# Patient Record
Sex: FEMALE | NOT HISPANIC OR LATINO | URBAN - METROPOLITAN AREA
[De-identification: names, ages, dates, MRNs, and addresses within clinical notes are randomized per-mention and may not be internally consistent; named-entity substitution may affect disease eponyms.]

---

## 2017-01-12 ENCOUNTER — OFFICE VISIT (OUTPATIENT)
Dept: INTERNAL MEDICINE | Facility: CLINIC | Age: 72
End: 2017-01-12

## 2017-01-12 VITALS
BODY MASS INDEX: 24.75 KG/M2 | OXYGEN SATURATION: 98 % | WEIGHT: 145 LBS | HEIGHT: 64 IN | HEART RATE: 72 BPM | SYSTOLIC BLOOD PRESSURE: 135 MMHG | DIASTOLIC BLOOD PRESSURE: 85 MMHG

## 2017-01-12 DIAGNOSIS — F41.9 ANXIETY: ICD-10-CM

## 2017-01-12 DIAGNOSIS — F17.200 SMOKING ADDICTION: ICD-10-CM

## 2017-01-12 DIAGNOSIS — E78.5 HYPERLIPIDEMIA, UNSPECIFIED HYPERLIPIDEMIA TYPE: Primary | ICD-10-CM

## 2017-01-12 PROCEDURE — 99214 OFFICE O/P EST MOD 30 MIN: CPT | Performed by: INTERNAL MEDICINE

## 2017-01-12 RX ORDER — CHLORAL HYDRATE 500 MG
1 CAPSULE ORAL
COMMUNITY
End: 2019-07-26

## 2017-01-12 RX ORDER — ALPRAZOLAM 0.25 MG/1
0.25 TABLET ORAL DAILY PRN
Qty: 30 TABLET | Refills: 0 | Status: SHIPPED | OUTPATIENT
Start: 2017-01-12 | End: 2017-03-22 | Stop reason: CLARIF

## 2017-01-12 RX ORDER — VITAMIN B COMPLEX
TABLET ORAL
COMMUNITY
End: 2017-07-18 | Stop reason: CLARIF

## 2017-01-12 NOTE — PROGRESS NOTES
Subjective     Sanjuanita Bowen is a 71 y.o. female, who presents with a chief complaint of   Chief Complaint   Patient presents with   • Establish Care     Former Dr. Banegas pt   • Anxiety     Pt states she has been having bad anxiety attacks b/c her  had a stroke around Thanksgiving-he was then diagnosed with Cancer.        HPI Comments: Anxiety: Patient is here to establish care and was previous patient of Dr. Casey. She is having a lot of anxiety lately because her  had a stroke and was also diagnosed with renal cell carcinoma a little before Thanksgiving. Her  is currently at [facility] for therapy. His left side was affected, but he is moving well now and walking with a cane. No SI/HI. She feels that her mind just can't stop when she is awake and she worries all the time.     HLD: chronic issue and did not tolerate statins well due to muscle aches and pains, even Livalo. Her cholesterol increased on last check in 11/2016.            The following portions of the patient's history were reviewed and updated as appropriate: allergies, current medications, past family history, past medical history, past social history, past surgical history and problem list.    Allergies: Review of patient's allergies indicates no known allergies.    Current Outpatient Prescriptions:   •  ALPRAZolam (XANAX) 0.25 MG tablet, Take 1 tablet by mouth Daily As Needed for anxiety., Disp: 30 tablet, Rfl: 0  •  aspirin 81 MG EC tablet, Take 81 mg by mouth daily., Disp: , Rfl:   •  Cholecalciferol (VITAMIN D PO), Take 1 tablet by mouth daily., Disp: , Rfl:   •  Coenzyme Q10 (COQ10) 100 MG capsule, Take  by mouth., Disp: , Rfl:   •  Omega-3 Fatty Acids (FISH OIL) 1000 MG capsule capsule, Take 1 capsule by mouth., Disp: , Rfl:   •  SERTRALINE HCL PO, Take 50 mg by mouth Daily., Disp: , Rfl:   •  vitamin E 400 UNIT capsule, Take 400 Units by mouth Daily., Disp: , Rfl:       Review of Systems   Constitutional:  "Negative for chills and fever.   Respiratory: Negative for cough and shortness of breath.    Cardiovascular: Negative for chest pain and palpitations.   Gastrointestinal: Negative for constipation, diarrhea and vomiting.   Musculoskeletal: Negative for myalgias.   Skin: Negative for rash.   Psychiatric/Behavioral: Positive for decreased concentration. The patient is nervous/anxious.        Objective     Visit Vitals   • /85 (BP Location: Left arm, Patient Position: Sitting, Cuff Size: Adult)   • Pulse 72   • Ht 64\" (162.6 cm)   • Wt 145 lb (65.8 kg)   • SpO2 98%   • BMI 24.89 kg/m2         Physical Exam   Constitutional: She is oriented to person, place, and time. She appears well-developed and well-nourished. No distress.   HENT:   Head: Normocephalic and atraumatic.   Right Ear: Tympanic membrane and external ear normal.   Left Ear: Tympanic membrane and external ear normal.   Mouth/Throat: Oropharynx is clear and moist. No oropharyngeal exudate.   Eyes: Conjunctivae are normal. Right eye exhibits no discharge. Left eye exhibits no discharge. No scleral icterus.   Neck: Neck supple.   Cardiovascular: Normal rate, regular rhythm and normal heart sounds.  Exam reveals no gallop and no friction rub.    No murmur heard.  Pulmonary/Chest: Effort normal and breath sounds normal. No respiratory distress. She has no wheezes. She has no rales.   Lymphadenopathy:     She has no cervical adenopathy.   Neurological: She is alert and oriented to person, place, and time.   Skin: Skin is warm. No rash noted.   Psychiatric: She has a normal mood and affect. Her behavior is normal.   Nursing note and vitals reviewed.        Results for orders placed or performed in visit on 10/28/16   Comprehensive Metabolic Panel   Result Value Ref Range    Glucose 112 (H) 65 - 99 mg/dL    BUN 17 8 - 23 mg/dL    Creatinine 1.07 (H) 0.57 - 1.00 mg/dL    eGFR Non African Am 50 (L) >60 mL/min/1.73    eGFR African Am 61 >60 mL/min/1.73    " BUN/Creatinine Ratio 15.9 7.0 - 25.0    Sodium 144 136 - 145 mmol/L    Potassium 4.7 3.5 - 5.2 mmol/L    Chloride 101 98 - 107 mmol/L    Total CO2 30.4 (H) 22.0 - 29.0 mmol/L    Calcium 10.6 (H) 8.6 - 10.5 mg/dL    Total Protein 7.4 6.0 - 8.5 g/dL    Albumin 4.80 3.50 - 5.20 g/dL    Globulin 2.6 gm/dL    A/G Ratio 1.8 g/dL    Total Bilirubin 0.7 0.1 - 1.2 mg/dL    Alkaline Phosphatase 109 39 - 117 U/L    AST (SGOT) 14 1 - 32 U/L    ALT (SGPT) 16 1 - 33 U/L   Hemoglobin A1c   Result Value Ref Range    Hemoglobin A1C 6.10 (H) 4.80 - 5.60 %   Lipid Panel   Result Value Ref Range    Total Cholesterol 258 (H) 0 - 200 mg/dL    Triglycerides 162 (H) 0 - 150 mg/dL    HDL Cholesterol 61 (H) 40 - 60 mg/dL    VLDL Cholesterol 32.4 5 - 40 mg/dL    LDL Cholesterol  165 (H) 0 - 100 mg/dL   CBC & Differential   Result Value Ref Range    WBC 6.99 4.50 - 10.70 10*3/mm3    RBC 5.01 3.90 - 5.20 10*6/mm3    Hemoglobin 15.8 (H) 11.9 - 15.5 g/dL    Hematocrit 50.4 (H) 35.6 - 45.5 %    .6 (H) 80.5 - 98.2 fL    MCH 31.5 26.9 - 32.0 pg    MCHC 31.3 (L) 32.4 - 36.3 g/dL    RDW 13.9 (H) 11.7 - 13.0 %    Platelets 202 140 - 500 10*3/mm3    Neutrophil Rel % 60.0 42.7 - 76.0 %    Lymphocyte Rel % 25.2 19.6 - 45.3 %    Monocyte Rel % 9.7 5.0 - 12.0 %    Eosinophil Rel % 3.9 0.3 - 6.2 %    Basophil Rel % 0.9 0.0 - 1.5 %    Neutrophils Absolute 4.20 1.90 - 8.10 10*3/mm3    Lymphocytes Absolute 1.76 0.90 - 4.80 10*3/mm3    Monocytes Absolute 0.68 0.20 - 1.20 10*3/mm3    Eosinophils Absolute 0.27 0.00 - 0.70 10*3/mm3    Basophils Absolute 0.06 0.00 - 0.20 10*3/mm3    Immature Granulocyte Rel % 0.3 0.0 - 0.5 %    Immature Grans Absolute 0.02 0.00 - 0.03 10*3/mm3       Assessment/Plan   Sanjuanita was seen today for establish care and anxiety.    Diagnoses and all orders for this visit:    Hyperlipidemia, unspecified hyperlipidemia type:Patient's cholesterol is not under good control and she has not tolerated statin well, even Livalo in past. Will  continue current regimen of fish oil, but increase to 2000mg per day. No side effects from medications reported. Will follow up in 6 months to monitor levels form 11/2016.    Anxiety: not doing well seoncdary to stress at home with . She is going to start Sertraline and take Xanax as needed for anxiety. Will follow up in 2 months.   -     ALPRAZolam (XANAX) 0.25 MG tablet; Take 1 tablet by mouth Daily As Needed for anxiety.    Smoking addiction: discussed smoking cessation and will attempt to quit once her anxiety level as reduced when 's health is more stable. She understand importance of quitting. I would like to get low dose CT scan in future, but does not tolerate. May consider CXR to screen for lung cancer.     Needs DEXA in future as well        Return in about 2 months (around 3/13/2017) for Recheck of anxiety.    Hedy Gonzalez MD  01/12/2017

## 2017-01-12 NOTE — PATIENT INSTRUCTIONS
Start taking sertraline every day for anxiety and I will see you back two months   Take Xanax as needed  Increase fish oil to 2000mg (2 grams total)

## 2017-01-12 NOTE — MR AVS SNAPSHOT
Sanjuanita LEDESMA Jessi   1/12/2017 2:00 PM   Office Visit    Dept Phone:  [phone]   Encounter #:  [id number]    Provider:  Hedy Gonzalez MD   Department:  [facility] INTERNAL MED AND PEDS                Your Full Care Plan              Today's Medication Changes          These changes are accurate as of: 1/12/17  3:17 PM.  If you have any questions, ask your nurse or doctor.               Medication(s)that have changed:     ALPRAZolam 0.25 MG tablet   Commonly known as:  XANAX   Take 1 tablet by mouth Daily As Needed for anxiety.   What changed:  See the new instructions.   Changed by:  Hedy Gonzalez MD            Where to Get Your Medications      You can get these medications from any pharmacy     Bring a paper prescription for each of these medications     ALPRAZolam 0.25 MG tablet                  Your Updated Medication List          This list is accurate as of: 1/12/17  3:17 PM.  Always use your most recent med list.                ALPRAZolam 0.25 MG tablet   Commonly known as:  XANAX   Take 1 tablet by mouth Daily As Needed for anxiety.       aspirin 81 MG EC tablet       CoQ10 100 MG capsule       fish oil 1000 MG capsule capsule       SERTRALINE HCL PO       VITAMIN D PO       vitamin E 400 UNIT capsule               You Were Diagnosed With        Codes Comments    Hyperlipidemia, unspecified hyperlipidemia type    -  Primary ICD-10-CM: E78.5  ICD-9-CM: 272.4     Anxiety     ICD-10-CM: F41.9  ICD-9-CM: 300.00     Smoking addiction     ICD-10-CM: F17.200  ICD-9-CM: 305.1       Instructions    Start taking sertraline every day for anxiety and I will see you back two months   Take Xanax as needed  Increase fish oil to 2000mg (2 grams total)       Patient Instructions History      Upcoming Appointments     Visit Type Date Time Department    NEW PATIENT 1/12/2017  2:00 PM [facility]      [facility] Signup     Our records indicate that you have an active [facility]  "[facility] [employer] account.    You can view your After Visit Summary by going to [email or web address] and logging in with your [employer] username and password.  If you don't have a [employer] username and password but a parent or guardian has access to your record, the parent or guardian should login with their own [employer] username and password and access your record to view the After Visit Summary.    If you have questions, you can email [email or web address] or call [phone] to talk to our [employer] staff.  Remember, [employer] is NOT to be used for urgent needs.  For medical emergencies, dial 911.               Other Info from Your Visit           Allergies     No Known Allergies      Reason for Visit     Establish Care Former Dr. Banegas pt    Anxiety Pt states she has been having bad anxiety attacks b/c her  had a stroke around Thanksgiving-he was then diagnosed with Cancer.       Vital Signs     Blood Pressure Pulse Height Weight Oxygen Saturation Body Mass Index    135/85 (BP Location: Left arm, Patient Position: Sitting, Cuff Size: Adult) 72 64\" (162.6 cm) 145 lb (65.8 kg) 98% 24.89 kg/m2    Smoking Status                   Current Every Day Smoker           Problems and Diagnoses Noted     Anxiety problem    High cholesterol or triglycerides    Osteoarthritis (arthritis due to wear and tear of joints)    Smoking addiction        "

## 2017-02-18 ENCOUNTER — DOCUMENTATION (OUTPATIENT)
Dept: INTERNAL MEDICINE | Facility: CLINIC | Age: 72
End: 2017-02-18

## 2017-02-22 ENCOUNTER — DOCUMENTATION (OUTPATIENT)
Dept: INTERNAL MEDICINE | Facility: CLINIC | Age: 72
End: 2017-02-22

## 2017-03-14 ENCOUNTER — TELEPHONE (OUTPATIENT)
Dept: INTERNAL MEDICINE | Facility: CLINIC | Age: 72
End: 2017-03-14

## 2017-03-14 NOTE — TELEPHONE ENCOUNTER
Below phoned to [facility] local pharmacy in patients chart.  Xanax 0.5mg PO daily PRN #30 no refills.      ----- Message from Hedy Gonzalez MD sent at 3/13/2017  5:34 PM EDT -----  Can you send in Xanax 0.5mg PO daily prn for anxiety for #30 and no refills for patient to pharmacy?

## 2017-03-22 ENCOUNTER — OFFICE VISIT (OUTPATIENT)
Dept: INTERNAL MEDICINE | Facility: CLINIC | Age: 72
End: 2017-03-22

## 2017-03-22 VITALS
DIASTOLIC BLOOD PRESSURE: 92 MMHG | HEIGHT: 64 IN | HEART RATE: 75 BPM | BODY MASS INDEX: 24.04 KG/M2 | OXYGEN SATURATION: 97 % | SYSTOLIC BLOOD PRESSURE: 148 MMHG | WEIGHT: 140.8 LBS

## 2017-03-22 DIAGNOSIS — R73.01 IMPAIRED FASTING GLUCOSE: ICD-10-CM

## 2017-03-22 DIAGNOSIS — E78.5 HYPERLIPIDEMIA, UNSPECIFIED HYPERLIPIDEMIA TYPE: ICD-10-CM

## 2017-03-22 DIAGNOSIS — I10 ESSENTIAL HYPERTENSION: Primary | ICD-10-CM

## 2017-03-22 DIAGNOSIS — F41.9 ANXIETY: ICD-10-CM

## 2017-03-22 DIAGNOSIS — Z13.29 SCREENING FOR THYROID DISORDER: ICD-10-CM

## 2017-03-22 PROCEDURE — 99214 OFFICE O/P EST MOD 30 MIN: CPT | Performed by: INTERNAL MEDICINE

## 2017-03-22 RX ORDER — ALPRAZOLAM 0.5 MG/1
0.5 TABLET ORAL
Refills: 0 | COMMUNITY
Start: 2017-03-14 | End: 2017-04-10 | Stop reason: SDUPTHER

## 2017-03-22 NOTE — PROGRESS NOTES
"Subjective     Sanjuanita Bowen is a 71 y.o. female, who presents with a chief complaint of   Chief Complaint   Patient presents with   • Follow-up     2 mon f/u   • Anxiety     re check        HPI Comments: Anxiety: Patient is here for follow up of her anxiety. Her  is starting on Voltrient next week and is going to see a pyschiratrist and he is still very depressed and struggling. She states that she \"hates every minute of this\". She is stressed taking care of her . She has axniety still and taking xanax up to twice per day, but some days, does not need it. She feels that she is struggling to keep him up and active during the day. They have a meeting next Friday with the hospice. She has also lost 5lbs. She is overwhelmed with reverything that juwan sis doilgn, but likes dancing and insructing.        Elevated BP: Her BP is elevated today and has been elevated at home. She has never had high blood pressure, but the stress is definitely playing and part in this.     HLD: cholesterol has been elevated in past and has been on Zetia and multiple statins that she did not tolerate. She is currently taking Vitamin E per her.        The following portions of the patient's history were reviewed and updated as appropriate: allergies, current medications, past family history, past medical history, past social history, past surgical history and problem list.    Allergies: Atorvastatin    Current Outpatient Prescriptions:   •  ALPRAZolam (XANAX) 0.5 MG tablet, Take 0.5 mg by mouth., Disp: , Rfl: 0  •  aspirin 81 MG EC tablet, Take 81 mg by mouth daily., Disp: , Rfl:   •  Cholecalciferol (VITAMIN D PO), Take 1 tablet by mouth daily., Disp: , Rfl:   •  Coenzyme Q10 (COQ10) 100 MG capsule, Take  by mouth., Disp: , Rfl:   •  Omega-3 Fatty Acids (FISH OIL) 1000 MG capsule capsule, Take 1 capsule by mouth., Disp: , Rfl:   •  SERTRALINE HCL PO, Take 50 mg by mouth Daily., Disp: , Rfl:   •  vitamin E 400 UNIT capsule, Take " "400 Units by mouth Daily., Disp: , Rfl:   Medications Discontinued During This Encounter   Medication Reason   • ALPRAZolam (XANAX) 0.25 MG tablet Formulary change       Review of Systems   Constitutional: Positive for appetite change, fatigue and unexpected weight change. Negative for chills and fever.   Respiratory: Negative for cough and shortness of breath.    Cardiovascular: Negative for chest pain and palpitations.   Psychiatric/Behavioral: Positive for dysphoric mood and sleep disturbance. Negative for suicidal ideas. The patient is nervous/anxious.        Objective     /92 (BP Location: Left arm, Patient Position: Sitting, Cuff Size: Adult)  Pulse 75  Ht 64\" (162.6 cm)  Wt 140 lb 12.8 oz (63.9 kg)  SpO2 97%  BMI 24.17 kg/m2      Physical Exam   Constitutional: She is oriented to person, place, and time. She appears well-developed and well-nourished. No distress.   HENT:   Head: Normocephalic and atraumatic.   Right Ear: External ear normal.   Left Ear: External ear normal.   Mouth/Throat: Oropharynx is clear and moist. No oropharyngeal exudate.   Eyes: Conjunctivae are normal. Right eye exhibits no discharge. Left eye exhibits no discharge. No scleral icterus.   Neck: Neck supple.   Lymphadenopathy:     She has no cervical adenopathy.   Neurological: She is alert and oriented to person, place, and time.   Skin: Skin is warm. No rash noted.   Psychiatric: She has a normal mood and affect. Her behavior is normal.   Nursing note and vitals reviewed.      Results for orders placed or performed in visit on 10/28/16   Comprehensive Metabolic Panel   Result Value Ref Range    Glucose 112 (H) 65 - 99 mg/dL    BUN 17 8 - 23 mg/dL    Creatinine 1.07 (H) 0.57 - 1.00 mg/dL    eGFR Non African Am 50 (L) >60 mL/min/1.73    eGFR African Am 61 >60 mL/min/1.73    BUN/Creatinine Ratio 15.9 7.0 - 25.0    Sodium 144 136 - 145 mmol/L    Potassium 4.7 3.5 - 5.2 mmol/L    Chloride 101 98 - 107 mmol/L    Total CO2 30.4 " (H) 22.0 - 29.0 mmol/L    Calcium 10.6 (H) 8.6 - 10.5 mg/dL    Total Protein 7.4 6.0 - 8.5 g/dL    Albumin 4.80 3.50 - 5.20 g/dL    Globulin 2.6 gm/dL    A/G Ratio 1.8 g/dL    Total Bilirubin 0.7 0.1 - 1.2 mg/dL    Alkaline Phosphatase 109 39 - 117 U/L    AST (SGOT) 14 1 - 32 U/L    ALT (SGPT) 16 1 - 33 U/L   Hemoglobin A1c   Result Value Ref Range    Hemoglobin A1C 6.10 (H) 4.80 - 5.60 %   Lipid Panel   Result Value Ref Range    Total Cholesterol 258 (H) 0 - 200 mg/dL    Triglycerides 162 (H) 0 - 150 mg/dL    HDL Cholesterol 61 (H) 40 - 60 mg/dL    VLDL Cholesterol 32.4 5 - 40 mg/dL    LDL Cholesterol  165 (H) 0 - 100 mg/dL   CBC & Differential   Result Value Ref Range    WBC 6.99 4.50 - 10.70 10*3/mm3    RBC 5.01 3.90 - 5.20 10*6/mm3    Hemoglobin 15.8 (H) 11.9 - 15.5 g/dL    Hematocrit 50.4 (H) 35.6 - 45.5 %    .6 (H) 80.5 - 98.2 fL    MCH 31.5 26.9 - 32.0 pg    MCHC 31.3 (L) 32.4 - 36.3 g/dL    RDW 13.9 (H) 11.7 - 13.0 %    Platelets 202 140 - 500 10*3/mm3    Neutrophil Rel % 60.0 42.7 - 76.0 %    Lymphocyte Rel % 25.2 19.6 - 45.3 %    Monocyte Rel % 9.7 5.0 - 12.0 %    Eosinophil Rel % 3.9 0.3 - 6.2 %    Basophil Rel % 0.9 0.0 - 1.5 %    Neutrophils Absolute 4.20 1.90 - 8.10 10*3/mm3    Lymphocytes Absolute 1.76 0.90 - 4.80 10*3/mm3    Monocytes Absolute 0.68 0.20 - 1.20 10*3/mm3    Eosinophils Absolute 0.27 0.00 - 0.70 10*3/mm3    Basophils Absolute 0.06 0.00 - 0.20 10*3/mm3    Immature Granulocyte Rel % 0.3 0.0 - 0.5 %    Immature Grans Absolute 0.02 0.00 - 0.03 10*3/mm3       Assessment/Plan   Sanjuanita was seen today for follow-up and anxiety.    Diagnoses and all orders for this visit:    Essential hypertension  -     CBC & Differential  -     Comprehensive Metabolic Panel    Anxiety  -     TSH    Hyperlipidemia, unspecified hyperlipidemia type  -     Lipid Panel With LDL / HDL Ratio    Impaired fasting glucose  -     Hemoglobin A1c  -     Urinalysis With / Culture If Indicated    Screening for thyroid  disorder  -     TSH      HTN: new diagnosis and I would like to not start medications in the next month if still high. Patient wants to try diet and exercise for one month before agreeing to medication which is reasonable .  I discussed with her that I will recheck labs today and then she needs to call me with results of her home BP's. Is she is unsuccessful with DASH diet and reduced caffeine, then we will need to start a medication to lower this.    Anxiety: Under fair control given situation. Will continue sertraline and Xanax as needed. I encouraged her to continue to dance and do things that help her mood and mind. She is agreeable. Will see back in 6 months as money is tight due to her husbands medical cost and we will stay in touch through the phone and on [employer].     HLD: will recheck numbers and calculate risks. If greater than 7.5%, will consider adding Livalo which I discussed with her in clinic today.     IFG: chronic issue and will recheck labs today. I dicussed diet and exercise today. Would be a candidate for metformin based on new guidelines, but patient does not like to take medications. Will discus with her in future once I have labs back.     Return in about 6 months (around 9/22/2017) for Recheck.    Hedy Gonzalez MD  03/22/2017

## 2017-03-26 LAB
ALBUMIN SERPL-MCNC: 4.4 G/DL (ref 3.5–5.2)
ALBUMIN/GLOB SERPL: 1.8 G/DL
ALP SERPL-CCNC: 89 U/L (ref 40–129)
ALT SERPL-CCNC: 13 U/L (ref 5–33)
APPEARANCE UR: CLEAR
AST SERPL-CCNC: 16 U/L (ref 5–32)
BACTERIA #/AREA URNS HPF: ABNORMAL /HPF
BACTERIA UR CULT: ABNORMAL
BACTERIA UR CULT: ABNORMAL
BASOPHILS # BLD AUTO: 0.07 10*3/MM3 (ref 0–0.2)
BASOPHILS NFR BLD AUTO: 0.8 % (ref 0–2)
BILIRUB SERPL-MCNC: 0.8 MG/DL (ref 0.2–1.2)
BILIRUB UR QL STRIP: NEGATIVE
BUN SERPL-MCNC: 19 MG/DL (ref 8–23)
BUN/CREAT SERPL: 19.6 (ref 7–25)
CALCIUM SERPL-MCNC: 9.6 MG/DL (ref 8.8–10.5)
CASTS URNS MICRO: ABNORMAL
CASTS URNS QL MICRO: PRESENT
CHLORIDE SERPL-SCNC: 104 MMOL/L (ref 98–107)
CHOLEST SERPL-MCNC: 234 MG/DL (ref 0–200)
CO2 SERPL-SCNC: 28.1 MMOL/L (ref 22–29)
COLOR UR: YELLOW
CREAT SERPL-MCNC: 0.97 MG/DL (ref 0.57–1)
EOSINOPHIL # BLD AUTO: 0.2 10*3/MM3 (ref 0.1–0.3)
EOSINOPHIL NFR BLD AUTO: 2.3 % (ref 0–4)
EPI CELLS #/AREA URNS HPF: ABNORMAL /HPF
ERYTHROCYTE [DISTWIDTH] IN BLOOD BY AUTOMATED COUNT: 12.8 % (ref 11.5–14.5)
GLOBULIN SER CALC-MCNC: 2.5 GM/DL
GLUCOSE SERPL-MCNC: 105 MG/DL (ref 65–99)
GLUCOSE UR QL: NEGATIVE
HBA1C MFR BLD: 6 % (ref 4.8–5.6)
HCT VFR BLD AUTO: 47.1 % (ref 37–47)
HDLC SERPL-MCNC: 62 MG/DL (ref 40–60)
HGB BLD-MCNC: 15.3 G/DL (ref 12–16)
HGB UR QL STRIP: ABNORMAL
IMM GRANULOCYTES # BLD: 0.02 10*3/MM3 (ref 0–0.03)
IMM GRANULOCYTES NFR BLD: 0.2 % (ref 0–0.5)
KETONES UR QL STRIP: NEGATIVE
LDLC SERPL CALC-MCNC: 152 MG/DL (ref 0–100)
LDLC/HDLC SERPL: 2.45 {RATIO}
LEUKOCYTE ESTERASE UR QL STRIP: ABNORMAL
LYMPHOCYTES # BLD AUTO: 1.55 10*3/MM3 (ref 0.6–4.8)
LYMPHOCYTES NFR BLD AUTO: 18.1 % (ref 20–45)
MCH RBC QN AUTO: 31.8 PG (ref 27–31)
MCHC RBC AUTO-ENTMCNC: 32.5 G/DL (ref 31–37)
MCV RBC AUTO: 97.9 FL (ref 81–99)
MICRO URNS: ABNORMAL
MONOCYTES # BLD AUTO: 0.62 10*3/MM3 (ref 0–1)
MONOCYTES NFR BLD AUTO: 7.2 % (ref 3–8)
MUCOUS THREADS URNS QL MICRO: PRESENT /HPF
NEUTROPHILS # BLD AUTO: 6.12 10*3/MM3 (ref 1.5–8.3)
NEUTROPHILS NFR BLD AUTO: 71.4 % (ref 45–70)
NITRITE UR QL STRIP: NEGATIVE
NRBC BLD AUTO-RTO: 0 /100 WBC (ref 0–0)
OTHER ANTIBIOTIC SUSC ISLT: ABNORMAL
PH UR STRIP: 5.5 [PH] (ref 5–7.5)
PLATELET # BLD AUTO: 203 10*3/MM3 (ref 140–500)
POTASSIUM SERPL-SCNC: 4.1 MMOL/L (ref 3.5–5.2)
PROT SERPL-MCNC: 6.9 G/DL (ref 6–8.5)
PROT UR QL STRIP: NEGATIVE
RBC # BLD AUTO: 4.81 10*6/MM3 (ref 4.2–5.4)
RBC #/AREA URNS HPF: ABNORMAL /HPF
SODIUM SERPL-SCNC: 143 MMOL/L (ref 136–145)
SP GR UR: 1.02 (ref 1–1.03)
TRIGL SERPL-MCNC: 101 MG/DL (ref 0–150)
TSH SERPL DL<=0.005 MIU/L-ACNC: 1.28 MIU/ML (ref 0.27–4.2)
URINALYSIS REFLEX: ABNORMAL
UROBILINOGEN UR STRIP-MCNC: 0.2 MG/DL (ref 0.2–1)
VLDLC SERPL CALC-MCNC: 20.2 MG/DL (ref 7–27)
WBC # BLD AUTO: 8.58 10*3/MM3 (ref 4.8–10.8)
WBC #/AREA URNS HPF: ABNORMAL /HPF

## 2017-03-27 NOTE — PROGRESS NOTES
Please call patient with these results and let her know that there are a few things that we need to address, but overall they are stable.     She still has pre-diabetes her HgA1c or 3 month blood sugar level is 6%. She would qualify to start 500mg of metformin to lower her blood sugars once per day, but I know that she is not interested in starting new medications. I want her to consider this though. The medication does not have a lot of side effects after the first two weeks of taking it. Generally patients have some nausea and gas, but tend to do okay once they get used to it. It basically helps your insulin work better to lower your blood sugars.     Second, her cholesterol is still elevated and I would like her to consider the cholesterol lowering medication Livalo that we discussed last time that she was here. When I calculate her risks based on her current medical issues, she has around a 15% chance of having a stroke or heart attach in the next 10 years and she qualifies for a statin cholesterol lowering medication like Dr. Ruffin had placed her on the past. The Livalo is processed through the body differently and is the drug of choice for patients that do not do well with the older statin.I think we should try it, if she is willing.     Lastly, it looks like she may have a small amount of infection in her urine. Is she having symptoms or any pain she urinates? i think we should treat with a 7 day course of antibiotics if she is agreeable.     Let me know if you need me to talk to her and I will. I know this is a lot of information

## 2017-03-28 ENCOUNTER — TELEPHONE (OUTPATIENT)
Dept: INTERNAL MEDICINE | Facility: CLINIC | Age: 72
End: 2017-03-28

## 2017-03-28 RX ORDER — SULFAMETHOXAZOLE AND TRIMETHOPRIM 800; 160 MG/1; MG/1
1 TABLET ORAL 2 TIMES DAILY
Qty: 14 TABLET | Refills: 0 | Status: SHIPPED | OUTPATIENT
Start: 2017-03-28 | End: 2017-04-04

## 2017-03-28 NOTE — TELEPHONE ENCOUNTER
Patient has been advised and voiced understanding. The patient denies any want for the RX Metformin. The patient is comfortable with starting the RX Livalo (whatever mg you suggest) and also starting an antibiotic for the small infection in her urine- she denies any dysuria etc. Her primary pharmacy is [facility] (in her chart).     ----- Message from Hedy Gonzalez MD sent at 3/27/2017  8:25 AM EDT -----  Please call patient with these results and let her know that there are a few things that we need to address, but overall they are stable.     She still has pre-diabetes her HgA1c or 3 month blood sugar level is 6%. She would qualify to start 500mg of metformin to lower her blood sugars once per day, but I know that she is not interested in starting new medications. I want her to consider this though. The medication does not have a lot of side effects after the first two weeks of taking it. Generally patients have some nausea and gas, but tend to do okay once they get used to it. It basically helps your insulin work better to lower your blood sugars.     Second, her cholesterol is still elevated and I would like her to consider the cholesterol lowering medication Livalo that we discussed last time that she was here. When I calculate her risks based on her current medical issues, she has around a 15% chance of having a stroke or heart attach in the next 10 years and she qualifies for a statin cholesterol lowering medication like Dr. Ruffin had placed her on the past. The Livalo is processed through the body differently and is the drug of choice for patients that do not do well with the older statin.I think we should try it, if she is willing.     Lastly, it looks like she may have a small amount of infection in her urine. Is she having symptoms or any pain she urinates? i think we should treat with a 7 day course of antibiotics if she is agreeable.     Let me know if you need me to talk to her and I will. I know this is  a lot of information

## 2017-04-10 RX ORDER — ALPRAZOLAM 0.5 MG/1
0.5 TABLET ORAL 2 TIMES DAILY PRN
Qty: 60 TABLET | Refills: 0 | Status: SHIPPED | OUTPATIENT
Start: 2017-04-10 | End: 2017-05-05 | Stop reason: SDUPTHER

## 2017-05-02 ENCOUNTER — TELEPHONE (OUTPATIENT)
Dept: INTERNAL MEDICINE | Facility: CLINIC | Age: 72
End: 2017-05-02

## 2017-05-05 ENCOUNTER — TELEPHONE (OUTPATIENT)
Dept: INTERNAL MEDICINE | Facility: CLINIC | Age: 72
End: 2017-05-05

## 2017-05-05 RX ORDER — ALPRAZOLAM 0.5 MG/1
0.5 TABLET ORAL 2 TIMES DAILY PRN
Qty: 60 TABLET | Refills: 0 | OUTPATIENT
Start: 2017-05-05 | End: 2017-06-05 | Stop reason: SDUPTHER

## 2017-05-08 ENCOUNTER — PATIENT MESSAGE (OUTPATIENT)
Dept: INTERNAL MEDICINE | Facility: CLINIC | Age: 72
End: 2017-05-08

## 2017-06-05 ENCOUNTER — TELEPHONE (OUTPATIENT)
Dept: INTERNAL MEDICINE | Facility: CLINIC | Age: 72
End: 2017-06-05

## 2017-06-05 RX ORDER — ALPRAZOLAM 0.5 MG/1
0.5 TABLET ORAL 2 TIMES DAILY PRN
Qty: 60 TABLET | Refills: 2 | OUTPATIENT
Start: 2017-06-05 | End: 2017-07-03 | Stop reason: SDUPTHER

## 2017-06-05 NOTE — TELEPHONE ENCOUNTER
Patient has been advised. [id number] LVM with RX, per Dr. Gonzalez.     ----- Message from Hedy Gonzalez MD sent at 6/5/2017  7:52 AM EDT -----  Regarding: FW: Prescription Question  Contact: [phone]  Can you call this in for Mrs. Bowen? Please give her 2 refills if possible which will give her a 3 month supply until I see her again in 9/2017. Can you also ask her how Mr. Bowen is doing?      ----- Message -----     From: Dani Burkett CMA     Sent: 6/5/2017   7:51 AM       To: Hedy Gonzalez MD  Subject: FW: Prescription Question                            ----- Message -----     From: Sanjuanita LEDESMA Jessi     Sent: 6/4/2017   1:16 PM       To: Luis Carlos [facility]  Subject: Prescription Question                            Need refill of prescription #[id number], Alprazolam, 2 daily - prescription runs out 6/5/17, need refilled for 6/6/17 please.

## 2017-06-05 NOTE — TELEPHONE ENCOUNTER
Patient states she contacted the [employer] help number this AM and LVM asking for someone to call her back, she is going to ask for help switching her messages to get sent properly to our office. As well as asking the help team questions regarding with Mr. Bowen's [employer].     ----- Message from Hedy Gonzalez MD sent at 6/5/2017  8:16 AM EDT -----  Regarding: FW: [employer] for Rob Bowen  Contact: [phone]  Can you help her with access to [employer] when you call her back?   ----- Message -----     From: Dani Burkett CMA     Sent: 6/5/2017   8:08 AM       To: Hedy Gonzalez MD  Subject: FW: [employer] for Rob Bowen                    ----- Message -----     From: Sanjuanita LEDESMA Bowen     Sent: 6/5/2017   8:07 AM       To: Luis Carlos [facility]  Subject: [employer] for Rob Bowen                        Dr. Gonzalez, I keep getting messages from [employer] for Rob - haven't been able to access - I have him signed in to [employer] for [facility], but have never set up one for [facility].  Don't I need an access code or something to set up one for Rob?  He's being released from [facility] this coming Friday and we see you next Monday the 12th.

## 2017-06-12 ENCOUNTER — APPOINTMENT (OUTPATIENT)
Dept: GENERAL RADIOLOGY | Facility: HOSPITAL | Age: 72
End: 2017-06-12

## 2017-06-12 DIAGNOSIS — S99.912A ANKLE INJURY, LEFT, INITIAL ENCOUNTER: Primary | ICD-10-CM

## 2017-06-12 PROCEDURE — 73610 X-RAY EXAM OF ANKLE: CPT | Performed by: GENERAL PRACTICE

## 2017-07-03 RX ORDER — ALPRAZOLAM 0.5 MG/1
0.5 TABLET ORAL 2 TIMES DAILY PRN
Qty: 60 TABLET | Refills: 2 | Status: SHIPPED | OUTPATIENT
Start: 2017-07-03 | End: 2017-08-17

## 2017-07-03 RX ORDER — ALPRAZOLAM 0.5 MG/1
0.5 TABLET ORAL 2 TIMES DAILY PRN
Qty: 60 TABLET | Refills: 2 | Status: SHIPPED | OUTPATIENT
Start: 2017-07-03 | End: 2017-07-03 | Stop reason: SDUPTHER

## 2017-07-03 RX ORDER — ALPRAZOLAM 0.5 MG/1
0.5 TABLET ORAL 2 TIMES DAILY PRN
Qty: 60 TABLET | Refills: 2 | OUTPATIENT
Start: 2017-07-03 | End: 2017-07-03 | Stop reason: SDUPTHER

## 2017-07-18 ENCOUNTER — OFFICE VISIT (OUTPATIENT)
Dept: INTERNAL MEDICINE | Facility: CLINIC | Age: 72
End: 2017-07-18

## 2017-07-18 VITALS
OXYGEN SATURATION: 98 % | SYSTOLIC BLOOD PRESSURE: 132 MMHG | DIASTOLIC BLOOD PRESSURE: 82 MMHG | HEIGHT: 65 IN | WEIGHT: 130.2 LBS | BODY MASS INDEX: 21.69 KG/M2 | HEART RATE: 79 BPM

## 2017-07-18 DIAGNOSIS — Z63.79 OTHER STRESSFUL LIFE EVENTS AFFECTING FAMILY AND HOUSEHOLD: ICD-10-CM

## 2017-07-18 DIAGNOSIS — F41.9 ANXIETY: Primary | ICD-10-CM

## 2017-07-18 PROCEDURE — 99214 OFFICE O/P EST MOD 30 MIN: CPT | Performed by: INTERNAL MEDICINE

## 2017-07-18 RX ORDER — SERTRALINE HYDROCHLORIDE 25 MG/1
25 TABLET, FILM COATED ORAL DAILY
Qty: 30 TABLET | Refills: 1 | Status: SHIPPED | OUTPATIENT
Start: 2017-07-18 | End: 2017-08-17

## 2017-07-18 NOTE — PROGRESS NOTES
"Subjective     Sanjuanita Bowen is a 72 y.o. female, who presents with a chief complaint of   Chief Complaint   Patient presents with   • Follow-up     6 mon f/u   • Anxiety     x questions, anxiety attacks \"almost every day\" taking rx xanax po bid       HPI Comments: 73 yo F with smoking addiction who presents for follow up of her anxiety. Her  has been sick with metastatic renal cell carcinoma. She is struggling with her mood and anxiety. She feels as though she just can't deal with caretaker stress and her  doing so poorly. She is sometimes taking two Xanax per day due to the stress. She is not sleeping well at times either. She feels that she doesn't have much of a life anymore other than caring for her  and they are always waiting on test to decide the next plan of action.        The following portions of the patient's history were reviewed and updated as appropriate: allergies, current medications, past family history, past medical history, past social history, past surgical history and problem list.    Allergies: Atorvastatin    Current Outpatient Prescriptions:   •  ALPRAZolam (XANAX) 0.5 MG tablet, Take 1 tablet by mouth 2 (Two) Times a Day As Needed for Anxiety., Disp: 60 tablet, Rfl: 2  •  aspirin 81 MG EC tablet, Take 81 mg by mouth daily., Disp: , Rfl:   •  Cholecalciferol (VITAMIN D PO), Take 1 tablet by mouth daily., Disp: , Rfl:   •  Omega-3 Fatty Acids (FISH OIL) 1000 MG capsule capsule, Take 1 capsule by mouth., Disp: , Rfl:   •  vitamin E 400 UNIT capsule, Take 400 Units by mouth Daily., Disp: , Rfl:   •  sertraline (ZOLOFT) 25 MG tablet, Take 1 tablet by mouth Daily., Disp: 30 tablet, Rfl: 1  Medications Discontinued During This Encounter   Medication Reason   • pitavastatin calcium (LIVALO) 1 MG tablet tablet Formulary change   • SERTRALINE HCL PO Formulary change   • Coenzyme Q10 (COQ10) 100 MG capsule Formulary change       Review of Systems   Constitutional: Negative for " "chills and fever.   HENT: Negative for congestion.    Respiratory: Negative for cough and shortness of breath.    Psychiatric/Behavioral: Positive for decreased concentration and dysphoric mood. Negative for sleep disturbance and suicidal ideas. The patient is nervous/anxious.        Objective     /82 (BP Location: Left arm, Patient Position: Sitting, Cuff Size: Adult)  Pulse 79  Ht 65\" (165.1 cm)  Wt 130 lb 3.2 oz (59.1 kg)  SpO2 98%  BMI 21.67 kg/m2      Physical Exam   Constitutional: She is oriented to person, place, and time. She appears well-developed and well-nourished. No distress.   HENT:   Head: Normocephalic and atraumatic.   Right Ear: External ear normal.   Left Ear: External ear normal.   Mouth/Throat: Oropharynx is clear and moist. No oropharyngeal exudate.   Eyes: Conjunctivae are normal. Right eye exhibits no discharge. Left eye exhibits no discharge. No scleral icterus.   Neck: Neck supple.   Cardiovascular: Normal rate, regular rhythm and normal heart sounds.  Exam reveals no gallop and no friction rub.    No murmur heard.  Pulmonary/Chest: Effort normal and breath sounds normal. No respiratory distress. She has no wheezes. She has no rales.   Lymphadenopathy:     She has no cervical adenopathy.   Neurological: She is alert and oriented to person, place, and time.   Skin: Skin is warm. No rash noted.   Psychiatric: She has a normal mood and affect. Her behavior is normal.   Nursing note and vitals reviewed.        Results for orders placed or performed in visit on 03/22/17   Urine culture, Comprehensive   Result Value Ref Range    Urine Culture Final report (A)     Result 1 Escherichia coli (A)     Susceptibility Testing Comment    Comprehensive Metabolic Panel   Result Value Ref Range    Glucose 105 (H) 65 - 99 mg/dL    BUN 19 8 - 23 mg/dL    Creatinine 0.97 0.57 - 1.00 mg/dL    eGFR Non African Am 56 (L) >60 mL/min/1.73    eGFR African Am 68 >60 mL/min/1.73    BUN/Creatinine Ratio " 19.6 7.0 - 25.0    Sodium 143 136 - 145 mmol/L    Potassium 4.1 3.5 - 5.2 mmol/L    Chloride 104 98 - 107 mmol/L    Total CO2 28.1 22.0 - 29.0 mmol/L    Calcium 9.6 8.8 - 10.5 mg/dL    Total Protein 6.9 6.0 - 8.5 g/dL    Albumin 4.40 3.50 - 5.20 g/dL    Globulin 2.5 gm/dL    A/G Ratio 1.8 g/dL    Total Bilirubin 0.8 0.2 - 1.2 mg/dL    Alkaline Phosphatase 89 40 - 129 U/L    AST (SGOT) 16 5 - 32 U/L    ALT (SGPT) 13 5 - 33 U/L   Lipid Panel With LDL / HDL Ratio   Result Value Ref Range    Total Cholesterol 234 (H) 0 - 200 mg/dL    Triglycerides 101 0 - 150 mg/dL    HDL Cholesterol 62 (H) 40 - 60 mg/dL    VLDL Cholesterol 20.2 7 - 27 mg/dL    LDL Cholesterol  152 (H) 0 - 100 mg/dL    LDL/HDL Ratio 2.45    Hemoglobin A1c   Result Value Ref Range    Hemoglobin A1C 6.00 (H) 4.80 - 5.60 %   TSH   Result Value Ref Range    TSH 1.280 0.270 - 4.200 mIU/mL   Urinalysis With / Culture If Indicated   Result Value Ref Range    Specific Gravity, UA 1.021 1.005 - 1.030    pH, UA 5.5 5.0 - 7.5    Color, UA Yellow Yellow    Appearance, UA Clear Clear    Leukocytes, UA 1+ (A) Negative    Protein Negative Negative/Trace    Glucose, UA Negative Negative    Ketones Negative Negative    Blood, UA 2+ (A) Negative    Bilirubin, UA Negative Negative    Urobilinogen, UA 0.2 0.2 - 1.0 mg/dL    Nitrite, UA Negative Negative    Microscopic Examination See below:     Urinalysis Reflex Comment    Microscopic Examination   Result Value Ref Range    WBC, UA 6-10 (A) 0 - 5 /hpf    RBC, UA 3-10 (A) 0 - 2 /hpf    Epithelial Cells (non renal) 0-10 0 - 10 /hpf    Casts Present (A) None seen    Cast Type Hyaline casts N/A    Mucus, UA Present Not Estab. /hpf    Bacteria, UA Many (A) None seen/Few /hpf   CBC & Differential   Result Value Ref Range    WBC 8.58 4.80 - 10.80 10*3/mm3    RBC 4.81 4.20 - 5.40 10*6/mm3    Hemoglobin 15.3 12.0 - 16.0 g/dL    Hematocrit 47.1 (H) 37.0 - 47.0 %    MCV 97.9 81.0 - 99.0 fL    MCH 31.8 (H) 27.0 - 31.0 pg    MCHC 32.5  31.0 - 37.0 g/dL    RDW 12.8 11.5 - 14.5 %    Platelets 203 140 - 500 10*3/mm3    Neutrophil Rel % 71.4 (H) 45.0 - 70.0 %    Lymphocyte Rel % 18.1 (L) 20.0 - 45.0 %    Monocyte Rel % 7.2 3.0 - 8.0 %    Eosinophil Rel % 2.3 0.0 - 4.0 %    Basophil Rel % 0.8 0.0 - 2.0 %    Neutrophils Absolute 6.12 1.50 - 8.30 10*3/mm3    Lymphocytes Absolute 1.55 0.60 - 4.80 10*3/mm3    Monocytes Absolute 0.62 0.00 - 1.00 10*3/mm3    Eosinophils Absolute 0.20 0.10 - 0.30 10*3/mm3    Basophils Absolute 0.07 0.00 - 0.20 10*3/mm3    Immature Granulocyte Rel % 0.2 0.0 - 0.5 %    Immature Grans Absolute 0.02 0.00 - 0.03 10*3/mm3    nRBC 0.0 0.0 - 0.0 /100 WBC       Assessment/Plan   Sanjuanita was seen today for follow-up and anxiety.    Diagnoses and all orders for this visit:    Anxiety    Other stressful life events affecting family and household    Other orders  -     sertraline (ZOLOFT) 25 MG tablet; Take 1 tablet by mouth Daily.      Will start Zoloft once daily and then use Xanax as needed. I will see her back in 4 weeks. She is going to see a therapist this week as well which I think is good.I will discuss care with Dr. Hernandez today. I do think that her  should go on hospice, but we are awaiting CT scan to make decision. I spent greater than 50% of 25 minutes discussing anxiety, treatment and management and providing anticipatory guidance regarding stress related to illness and caring for loved one and hospice care.     Will recheck cholesterol and labs when she returns for follow up in 4 weeks.    Return in about 4 weeks (around 8/15/2017) for Recheck of anxiety and cholesterol .    Hedy Gonzalez MD  07/18/2017

## 2017-07-31 ENCOUNTER — TELEPHONE (OUTPATIENT)
Dept: INTERNAL MEDICINE | Facility: CLINIC | Age: 72
End: 2017-07-31

## 2017-07-31 NOTE — TELEPHONE ENCOUNTER
Patient has been advised and voiced understanding. [facility] pharmacy in patient chart, LVM on pharmacy vm with script below per Dr. Gonzalez    ----- Message from Hedy Gonzalez MD sent at 7/31/2017  1:40 PM EDT -----  Can you send in Klonopin 0.5mg PO BID PRN anxiety, #60 with no refills for her? Please tell her to stop Xanax.

## 2017-08-17 ENCOUNTER — TELEPHONE (OUTPATIENT)
Dept: INTERNAL MEDICINE | Facility: CLINIC | Age: 72
End: 2017-08-17

## 2017-08-17 ENCOUNTER — OFFICE VISIT (OUTPATIENT)
Dept: INTERNAL MEDICINE | Facility: CLINIC | Age: 72
End: 2017-08-17

## 2017-08-17 VITALS
SYSTOLIC BLOOD PRESSURE: 128 MMHG | HEIGHT: 65 IN | WEIGHT: 128 LBS | OXYGEN SATURATION: 96 % | BODY MASS INDEX: 21.33 KG/M2 | DIASTOLIC BLOOD PRESSURE: 82 MMHG | HEART RATE: 88 BPM

## 2017-08-17 DIAGNOSIS — E78.5 HYPERLIPIDEMIA, UNSPECIFIED HYPERLIPIDEMIA TYPE: ICD-10-CM

## 2017-08-17 DIAGNOSIS — R73.01 IFG (IMPAIRED FASTING GLUCOSE): ICD-10-CM

## 2017-08-17 DIAGNOSIS — F41.9 ANXIETY: Primary | ICD-10-CM

## 2017-08-17 DIAGNOSIS — E78.5 HYPERLIPIDEMIA, UNSPECIFIED HYPERLIPIDEMIA TYPE: Primary | ICD-10-CM

## 2017-08-17 DIAGNOSIS — R73.01 IMPAIRED FASTING GLUCOSE: ICD-10-CM

## 2017-08-17 PROCEDURE — 99214 OFFICE O/P EST MOD 30 MIN: CPT | Performed by: INTERNAL MEDICINE

## 2017-08-17 RX ORDER — CLONAZEPAM 0.5 MG/1
TABLET ORAL
Refills: 0 | COMMUNITY
Start: 2017-07-31 | End: 2017-08-17

## 2017-08-17 RX ORDER — ALPRAZOLAM 0.5 MG/1
0.5 TABLET ORAL 3 TIMES DAILY PRN
Qty: 90 TABLET | Refills: 2 | OUTPATIENT
Start: 2017-08-17 | End: 2017-10-20

## 2017-08-17 NOTE — TELEPHONE ENCOUNTER
Orders added.     ----- Message -----     From: Gaby Mcfarland     Sent: 8/17/2017  11:41 AM       To: Luis Carlos Kim [facility]  Subject: LAB ORDERS PLEASE                                VALENTIN    Patient was here today to see Dr. Gonzalez.  Scheduled to come back in September for labs but would prefer to go to [facility] near her on [address] in [place].     Can we put the orders in for September and I will [facility] for her to have done on there in September.      Pt # [phone]

## 2017-08-17 NOTE — PROGRESS NOTES
Subjective     Sanjuanita Bowen is a 72 y.o. female, who presents with a chief complaint of   Chief Complaint   Patient presents with   • Follow-up     4 wk f/u, rx check, pt fasting for cholesterol re ceck    • Anxiety     pt states rx klonopin bid prn is not helping        HPI Comments: 73 yo F here for follow up. Her  is currently dying at home on hospice from metastatic renal cancer. She is not coping well with the anxiety and stress. She has tried SSRI's in past and they have not helped. Xanax seems to help the most. She is still smoking. She is having lots of care giver depression and burnout. Rob is not drinking or eating much now and they think that he will die soon.     Her labs are due in 9/2017 to check her HLD and IFG. She has not been taking very good care of herself. She is trying to eat well, but cannot all the time. She has lost 5lbs since I saw her in June.        The following portions of the patient's history were reviewed and updated as appropriate: allergies, current medications, past family history, past medical history, past social history, past surgical history and problem list.    Allergies: Atorvastatin    Current Outpatient Prescriptions:   •  aspirin 81 MG EC tablet, Take 81 mg by mouth daily., Disp: , Rfl:   •  Cholecalciferol (VITAMIN D PO), Take 1 tablet by mouth daily., Disp: , Rfl:   •  Omega-3 Fatty Acids (FISH OIL) 1000 MG capsule capsule, Take 1 capsule by mouth., Disp: , Rfl:   •  vitamin E 400 UNIT capsule, Take 400 Units by mouth Daily., Disp: , Rfl:   •  ALPRAZolam (XANAX) 0.5 MG tablet, Take 1 tablet by mouth 3 (Three) Times a Day As Needed for Anxiety., Disp: 90 tablet, Rfl: 2  Medications Discontinued During This Encounter   Medication Reason   • ALPRAZolam (XANAX) 0.5 MG tablet    • sertraline (ZOLOFT) 25 MG tablet Therapy completed   • clonazePAM (KlonoPIN) 0.5 MG tablet Therapy completed       Review of Systems   Constitutional: Negative for chills and fever.  "  Neurological: Negative for dizziness and headaches.   Psychiatric/Behavioral: Positive for decreased concentration and dysphoric mood. Negative for sleep disturbance and suicidal ideas. The patient is nervous/anxious.        Objective     /82 (BP Location: Left arm, Patient Position: Sitting, Cuff Size: Adult)  Pulse 88  Ht 65\" (165.1 cm)  Wt 128 lb (58.1 kg)  SpO2 96%  BMI 21.3 kg/m2      Physical Exam   Constitutional: She is oriented to person, place, and time. She appears well-developed and well-nourished. No distress.   HENT:   Head: Normocephalic and atraumatic.   Right Ear: External ear normal.   Left Ear: External ear normal.   Mouth/Throat: Oropharynx is clear and moist. No oropharyngeal exudate.   Eyes: Conjunctivae are normal. Right eye exhibits no discharge. Left eye exhibits no discharge. No scleral icterus.   Neck: Neck supple.   Cardiovascular: Normal rate, regular rhythm and normal heart sounds.  Exam reveals no gallop and no friction rub.    No murmur heard.  Pulmonary/Chest: Effort normal and breath sounds normal. No respiratory distress. She has no wheezes. She has no rales.   Lymphadenopathy:     She has no cervical adenopathy.   Neurological: She is alert and oriented to person, place, and time.   Skin: Skin is warm. No rash noted.   Psychiatric: Her behavior is normal. Her mood appears anxious. Her affect is angry. She exhibits a depressed mood.   Nursing note and vitals reviewed.        Results for orders placed or performed in visit on 03/22/17   Urine culture, Comprehensive   Result Value Ref Range    Urine Culture Final report (A)     Result 1 Escherichia coli (A)     Susceptibility Testing Comment    Comprehensive Metabolic Panel   Result Value Ref Range    Glucose 105 (H) 65 - 99 mg/dL    BUN 19 8 - 23 mg/dL    Creatinine 0.97 0.57 - 1.00 mg/dL    eGFR Non African Am 56 (L) >60 mL/min/1.73    eGFR African Am 68 >60 mL/min/1.73    BUN/Creatinine Ratio 19.6 7.0 - 25.0    Sodium " 143 136 - 145 mmol/L    Potassium 4.1 3.5 - 5.2 mmol/L    Chloride 104 98 - 107 mmol/L    Total CO2 28.1 22.0 - 29.0 mmol/L    Calcium 9.6 8.8 - 10.5 mg/dL    Total Protein 6.9 6.0 - 8.5 g/dL    Albumin 4.40 3.50 - 5.20 g/dL    Globulin 2.5 gm/dL    A/G Ratio 1.8 g/dL    Total Bilirubin 0.8 0.2 - 1.2 mg/dL    Alkaline Phosphatase 89 40 - 129 U/L    AST (SGOT) 16 5 - 32 U/L    ALT (SGPT) 13 5 - 33 U/L   Lipid Panel With LDL / HDL Ratio   Result Value Ref Range    Total Cholesterol 234 (H) 0 - 200 mg/dL    Triglycerides 101 0 - 150 mg/dL    HDL Cholesterol 62 (H) 40 - 60 mg/dL    VLDL Cholesterol 20.2 7 - 27 mg/dL    LDL Cholesterol  152 (H) 0 - 100 mg/dL    LDL/HDL Ratio 2.45    Hemoglobin A1c   Result Value Ref Range    Hemoglobin A1C 6.00 (H) 4.80 - 5.60 %   TSH   Result Value Ref Range    TSH 1.280 0.270 - 4.200 mIU/mL   Urinalysis With / Culture If Indicated   Result Value Ref Range    Specific Gravity, UA 1.021 1.005 - 1.030    pH, UA 5.5 5.0 - 7.5    Color, UA Yellow Yellow    Appearance, UA Clear Clear    Leukocytes, UA 1+ (A) Negative    Protein Negative Negative/Trace    Glucose, UA Negative Negative    Ketones Negative Negative    Blood, UA 2+ (A) Negative    Bilirubin, UA Negative Negative    Urobilinogen, UA 0.2 0.2 - 1.0 mg/dL    Nitrite, UA Negative Negative    Microscopic Examination See below:     Urinalysis Reflex Comment    Microscopic Examination   Result Value Ref Range    WBC, UA 6-10 (A) 0 - 5 /hpf    RBC, UA 3-10 (A) 0 - 2 /hpf    Epithelial Cells (non renal) 0-10 0 - 10 /hpf    Casts Present (A) None seen    Cast Type Hyaline casts N/A    Mucus, UA Present Not Estab. /hpf    Bacteria, UA Many (A) None seen/Few /hpf   CBC & Differential   Result Value Ref Range    WBC 8.58 4.80 - 10.80 10*3/mm3    RBC 4.81 4.20 - 5.40 10*6/mm3    Hemoglobin 15.3 12.0 - 16.0 g/dL    Hematocrit 47.1 (H) 37.0 - 47.0 %    MCV 97.9 81.0 - 99.0 fL    MCH 31.8 (H) 27.0 - 31.0 pg    MCHC 32.5 31.0 - 37.0 g/dL    RDW  12.8 11.5 - 14.5 %    Platelets 203 140 - 500 10*3/mm3    Neutrophil Rel % 71.4 (H) 45.0 - 70.0 %    Lymphocyte Rel % 18.1 (L) 20.0 - 45.0 %    Monocyte Rel % 7.2 3.0 - 8.0 %    Eosinophil Rel % 2.3 0.0 - 4.0 %    Basophil Rel % 0.8 0.0 - 2.0 %    Neutrophils Absolute 6.12 1.50 - 8.30 10*3/mm3    Lymphocytes Absolute 1.55 0.60 - 4.80 10*3/mm3    Monocytes Absolute 0.62 0.00 - 1.00 10*3/mm3    Eosinophils Absolute 0.20 0.10 - 0.30 10*3/mm3    Basophils Absolute 0.07 0.00 - 0.20 10*3/mm3    Immature Granulocyte Rel % 0.2 0.0 - 0.5 %    Immature Grans Absolute 0.02 0.00 - 0.03 10*3/mm3    nRBC 0.0 0.0 - 0.0 /100 WBC       Assessment/Plan   Sanjuanita was seen today for follow-up and anxiety.    Diagnoses and all orders for this visit:    Anxiety    Hyperlipidemia, unspecified hyperlipidemia type    Impaired fasting glucose    Other orders  -     ALPRAZolam (XANAX) 0.5 MG tablet; Take 1 tablet by mouth 3 (Three) Times a Day As Needed for Anxiety.    I have increased Xanax to three times per day as needed. We have tried SSRI's and Klonopin in the past and they have not been helpful. The patient knows that these are addicting, but I am hoping that she will be able to taper off once her situation is better. Rbo is currently on hospice barely eating or drinking and I think that it will no be long before he dies. The patient understands this and is tearful and in distress today. She has many emotions today. She has my number and knows that she can call anytime. Follow up in 4 weeks with labs before. I spent 25 minutes of face to face time with patient counseling her on the normal grieving process and care giver depression and burnout       Will follow up of on HDL and IFG when she returns next month.     Return in about 4 weeks (around 9/17/2017) for Recheck.    Hedy Gonzalez MD  08/17/2017

## 2017-08-17 NOTE — TELEPHONE ENCOUNTER
RX Xanax 0.5mg po TID prn #90 2 refills called into pharmacy [facility] in [place] per Dr. Gonzalez.     ----- Message from Hedy Gonzalez MD sent at 8/17/2017 10:10 AM EDT -----  Can you call in Xanax for her ASAP so that she can pick it up on her way home? Thanks

## 2017-10-06 LAB
ALBUMIN SERPL-MCNC: 4.8 G/DL (ref 3.5–4.8)
ALBUMIN/GLOB SERPL: 1.6 {RATIO} (ref 1.2–2.2)
ALP SERPL-CCNC: 100 IU/L (ref 39–117)
ALT SERPL-CCNC: 13 IU/L (ref 0–32)
APPEARANCE UR: ABNORMAL
AST SERPL-CCNC: 19 IU/L (ref 0–40)
BACTERIA #/AREA URNS HPF: ABNORMAL /[HPF]
BACTERIA UR CULT: NORMAL
BACTERIA UR CULT: NORMAL
BASOPHILS # BLD AUTO: 0.1 X10E3/UL (ref 0–0.2)
BASOPHILS NFR BLD AUTO: 1 %
BILIRUB SERPL-MCNC: 0.8 MG/DL (ref 0–1.2)
BILIRUB UR QL STRIP: NEGATIVE
BUN SERPL-MCNC: 17 MG/DL (ref 8–27)
BUN/CREAT SERPL: 16 (ref 12–28)
CALCIUM SERPL-MCNC: 10 MG/DL (ref 8.7–10.3)
CASTS URNS MICRO: ABNORMAL
CASTS URNS QL MICRO: PRESENT /LPF
CHLORIDE SERPL-SCNC: 101 MMOL/L (ref 96–106)
CHOLEST SERPL-MCNC: 255 MG/DL (ref 100–199)
CHOLEST/HDLC SERPL: 3.4 RATIO UNITS (ref 0–4.4)
CO2 SERPL-SCNC: 25 MMOL/L (ref 18–29)
COLOR UR: YELLOW
CREAT SERPL-MCNC: 1.08 MG/DL (ref 0.57–1)
EOSINOPHIL # BLD AUTO: 0.2 X10E3/UL (ref 0–0.4)
EOSINOPHIL NFR BLD AUTO: 2 %
EPI CELLS #/AREA URNS HPF: ABNORMAL /HPF
ERYTHROCYTE [DISTWIDTH] IN BLOOD BY AUTOMATED COUNT: 13.6 % (ref 12.3–15.4)
GLOBULIN SER CALC-MCNC: 3 G/DL (ref 1.5–4.5)
GLUCOSE SERPL-MCNC: 126 MG/DL (ref 65–99)
GLUCOSE UR QL: NEGATIVE
HBA1C MFR BLD: 6 % (ref 4.8–5.6)
HCT VFR BLD AUTO: 48.7 % (ref 34–46.6)
HDLC SERPL-MCNC: 75 MG/DL
HGB BLD-MCNC: 16.8 G/DL (ref 11.1–15.9)
HGB UR QL STRIP: ABNORMAL
IMM GRANULOCYTES # BLD: 0 X10E3/UL (ref 0–0.1)
IMM GRANULOCYTES NFR BLD: 0 %
KETONES UR QL STRIP: NEGATIVE
LDLC SERPL CALC-MCNC: 152 MG/DL (ref 0–99)
LEUKOCYTE ESTERASE UR QL STRIP: ABNORMAL
LYMPHOCYTES # BLD AUTO: 1.4 X10E3/UL (ref 0.7–3.1)
LYMPHOCYTES NFR BLD AUTO: 15 %
MCH RBC QN AUTO: 33.2 PG (ref 26.6–33)
MCHC RBC AUTO-ENTMCNC: 34.5 G/DL (ref 31.5–35.7)
MCV RBC AUTO: 96 FL (ref 79–97)
MICRO URNS: ABNORMAL
MONOCYTES # BLD AUTO: 0.7 X10E3/UL (ref 0.1–0.9)
MONOCYTES NFR BLD AUTO: 7 %
MUCOUS THREADS URNS QL MICRO: PRESENT
NEUTROPHILS # BLD AUTO: 7.1 X10E3/UL (ref 1.4–7)
NEUTROPHILS NFR BLD AUTO: 75 %
NITRITE UR QL STRIP: NEGATIVE
PH UR STRIP: 5.5 [PH] (ref 5–7.5)
PLATELET # BLD AUTO: 215 X10E3/UL (ref 150–379)
POTASSIUM SERPL-SCNC: 4.7 MMOL/L (ref 3.5–5.2)
PROT SERPL-MCNC: 7.8 G/DL (ref 6–8.5)
PROT UR QL STRIP: ABNORMAL
RBC # BLD AUTO: 5.06 X10E6/UL (ref 3.77–5.28)
RBC #/AREA URNS HPF: ABNORMAL /HPF
SODIUM SERPL-SCNC: 144 MMOL/L (ref 134–144)
SP GR UR: 1.02 (ref 1–1.03)
TRIGL SERPL-MCNC: 141 MG/DL (ref 0–149)
URINALYSIS REFLEX: ABNORMAL
UROBILINOGEN UR STRIP-MCNC: 0.2 MG/DL (ref 0.2–1)
VLDLC SERPL CALC-MCNC: 28 MG/DL (ref 5–40)
WBC # BLD AUTO: 9.5 X10E3/UL (ref 3.4–10.8)
WBC #/AREA URNS HPF: ABNORMAL /HPF

## 2017-10-09 ENCOUNTER — TELEPHONE (OUTPATIENT)
Dept: INTERNAL MEDICINE | Facility: CLINIC | Age: 72
End: 2017-10-09

## 2017-10-09 NOTE — TELEPHONE ENCOUNTER
"Patient has been advised and voiced understanding. Patient states she is having \"anxiety issues\", nervous, also stating she is trying to wean herself off of RX Xanax. I asked patient if she wanted to move up her f/u with Dr. Gonzalez and patient states she would like to see how she does, but if feeling worse will call back to move apt up.     ----- Message from Hedy Gonzalez MD sent at 10/6/2017  8:07 AM EDT -----  Please call patient and let her know that I reviewed her labs. She still has elevated BG's and cholesterol which we will talk about at her visit. She has some elevation in her Hgb as well likely related to her smoking and we need to talk about this then. Nothing to worry about now. I hope she is doing well and call me if she needs anything before late October.    "

## 2017-10-20 ENCOUNTER — OFFICE VISIT (OUTPATIENT)
Dept: INTERNAL MEDICINE | Facility: CLINIC | Age: 72
End: 2017-10-20

## 2017-10-20 VITALS
OXYGEN SATURATION: 93 % | DIASTOLIC BLOOD PRESSURE: 76 MMHG | HEART RATE: 79 BPM | BODY MASS INDEX: 21.49 KG/M2 | HEIGHT: 65 IN | SYSTOLIC BLOOD PRESSURE: 118 MMHG | WEIGHT: 129 LBS

## 2017-10-20 DIAGNOSIS — R73.01 IMPAIRED FASTING GLUCOSE: Primary | ICD-10-CM

## 2017-10-20 DIAGNOSIS — F17.200 SMOKING ADDICTION: ICD-10-CM

## 2017-10-20 DIAGNOSIS — E78.01 FAMILIAL HYPERCHOLESTEROLEMIA: ICD-10-CM

## 2017-10-20 DIAGNOSIS — F41.9 ANXIETY: ICD-10-CM

## 2017-10-20 PROCEDURE — 90662 IIV NO PRSV INCREASED AG IM: CPT | Performed by: INTERNAL MEDICINE

## 2017-10-20 PROCEDURE — 99214 OFFICE O/P EST MOD 30 MIN: CPT | Performed by: INTERNAL MEDICINE

## 2017-10-20 PROCEDURE — G0008 ADMIN INFLUENZA VIRUS VAC: HCPCS | Performed by: INTERNAL MEDICINE

## 2017-10-20 RX ORDER — EZETIMIBE 10 MG/1
10 TABLET ORAL DAILY
Qty: 30 TABLET | Refills: 3 | Status: SHIPPED | OUTPATIENT
Start: 2017-10-20 | End: 2018-01-19 | Stop reason: SDUPTHER

## 2017-10-20 RX ORDER — ALPRAZOLAM 0.5 MG/1
0.5 TABLET ORAL 2 TIMES DAILY PRN
Qty: 90 TABLET | Refills: 2 | OUTPATIENT
Start: 2017-10-20 | End: 2018-01-12 | Stop reason: DRUGHIGH

## 2017-10-20 NOTE — PROGRESS NOTES
"Subjective     Sanjuanita Bowen is a 72 y.o. female, who presents with a chief complaint of   Chief Complaint   Patient presents with   • Follow-up     6 month f/u   • Anxiety       HPI Comments: 71 yo F here for follow up. Her  just passed about one month ago. She is doing well, but still has issues throughout the day. She is trying to stay busy. She is trying to go out and do things with her friends. She is taking 1-3 Xanax per day. She is sleeping good. She is working out in the yard a lot and working on selling her house.     Still smoking about 0.5 packs per day. She wants to quit.     I reviewed her labs and she has IFG as well as elevated cholesterol. She has not done well with statins in the past. She has tolerated Zetia.        The following portions of the patient's history were reviewed and updated as appropriate: allergies, current medications, past family history, past medical history, past social history, past surgical history and problem list.    Allergies: Atorvastatin    Current Outpatient Prescriptions:   •  ALPRAZolam (XANAX) 0.5 MG tablet, Take 1 tablet by mouth 2 (Two) Times a Day As Needed., Disp: 90 tablet, Rfl: 2  •  aspirin 81 MG EC tablet, Take 81 mg by mouth daily., Disp: , Rfl:   •  Cholecalciferol (VITAMIN D PO), Take 1 tablet by mouth daily., Disp: , Rfl:   •  Cyanocobalamin (VITAMIN B12 PO), Take  by mouth., Disp: , Rfl:   •  Omega-3 Fatty Acids (FISH OIL) 1000 MG capsule capsule, Take 1 capsule by mouth., Disp: , Rfl:   •  vitamin E 400 UNIT capsule, Take 400 Units by mouth Daily., Disp: , Rfl:   •  ezetimibe (ZETIA) 10 MG tablet, Take 1 tablet by mouth Daily., Disp: 30 tablet, Rfl: 3  Medications Discontinued During This Encounter   Medication Reason   • ALPRAZolam (XANAX) 0.5 MG tablet        Review of Systems    Objective     /76 (BP Location: Right arm, Patient Position: Sitting, Cuff Size: Adult)  Pulse 79  Ht 65\" (165.1 cm)  Wt 129 lb (58.5 kg)  SpO2 93%  BMI " 21.47 kg/m2      Physical Exam   Constitutional: She is oriented to person, place, and time. She appears well-developed and well-nourished. No distress.   HENT:   Head: Normocephalic and atraumatic.   Right Ear: External ear normal.   Left Ear: External ear normal.   Mouth/Throat: Oropharynx is clear and moist. No oropharyngeal exudate.   Eyes: Conjunctivae are normal. Right eye exhibits no discharge. Left eye exhibits no discharge. No scleral icterus.   Neck: Neck supple.   Cardiovascular: Normal rate, regular rhythm and normal heart sounds.  Exam reveals no gallop and no friction rub.    No murmur heard.  Pulmonary/Chest: Effort normal. No respiratory distress. She has decreased breath sounds. She has no wheezes. She has no rales.   Lymphadenopathy:     She has no cervical adenopathy.   Neurological: She is alert and oriented to person, place, and time.   Skin: Skin is warm. No rash noted.   Psychiatric: She has a normal mood and affect. Her behavior is normal.   Nursing note and vitals reviewed.        Results for orders placed or performed in visit on 08/17/17   Urine culture, Comprehensive   Result Value Ref Range    Urine Culture Final report     Result 1 Comment    Comprehensive metabolic panel   Result Value Ref Range    Glucose 126 (H) 65 - 99 mg/dL    BUN 17 8 - 27 mg/dL    Creatinine 1.08 (H) 0.57 - 1.00 mg/dL    eGFR Non African Am 51 (L) >59 mL/min/1.73    eGFR African Am 59 (L) >59 mL/min/1.73    BUN/Creatinine Ratio 16 12 - 28    Sodium 144 134 - 144 mmol/L    Potassium 4.7 3.5 - 5.2 mmol/L    Chloride 101 96 - 106 mmol/L    Total CO2 25 18 - 29 mmol/L    Calcium 10.0 8.7 - 10.3 mg/dL    Total Protein 7.8 6.0 - 8.5 g/dL    Albumin 4.8 3.5 - 4.8 g/dL    Globulin 3.0 1.5 - 4.5 g/dL    A/G Ratio 1.6 1.2 - 2.2    Total Bilirubin 0.8 0.0 - 1.2 mg/dL    Alkaline Phosphatase 100 39 - 117 IU/L    AST (SGOT) 19 0 - 40 IU/L    ALT (SGPT) 13 0 - 32 IU/L   Lipid Panel w/ Chol/HDL Ratio   Result Value Ref Range     Total Cholesterol 255 (H) 100 - 199 mg/dL    Triglycerides 141 0 - 149 mg/dL    HDL Cholesterol 75 >39 mg/dL    VLDL Cholesterol 28 5 - 40 mg/dL    LDL Cholesterol  152 (H) 0 - 99 mg/dL    Chol/HDL Ratio 3.4 0.0 - 4.4 ratio units   Urinalysis With / Culture If Indicated   Result Value Ref Range    Specific Gravity, UA 1.022 1.005 - 1.030    pH, UA 5.5 5.0 - 7.5    Color, UA Yellow Yellow    Appearance, UA Cloudy (A) Clear    Leukocytes, UA 1+ (A) Negative    Protein Trace Negative/Trace    Glucose, UA Negative Negative    Ketones Negative Negative    Blood, UA 2+ (A) Negative    Bilirubin, UA Negative Negative    Urobilinogen, UA 0.2 0.2 - 1.0 mg/dL    Nitrite, UA Negative Negative    Microscopic Examination See below:     Urinalysis Reflex Comment    Hemoglobin A1c   Result Value Ref Range    Hemoglobin A1C 6.0 (H) 4.8 - 5.6 %   Microscopic Examination   Result Value Ref Range    WBC, UA 6-10 (A) 0 - 5 /hpf    RBC, UA 0-2 0 - 2 /hpf    Epithelial Cells (non renal) 0-10 0 - 10 /hpf    Casts Present (A) None seen /lpf    Cast Type Hyaline casts N/A    Mucus, UA Present Not Estab.    Bacteria, UA Moderate (A) None seen/Few   CBC & Differential   Result Value Ref Range    WBC 9.5 3.4 - 10.8 x10E3/uL    RBC 5.06 3.77 - 5.28 x10E6/uL    Hemoglobin 16.8 (H) 11.1 - 15.9 g/dL    Hematocrit 48.7 (H) 34.0 - 46.6 %    MCV 96 79 - 97 fL    MCH 33.2 (H) 26.6 - 33.0 pg    MCHC 34.5 31.5 - 35.7 g/dL    RDW 13.6 12.3 - 15.4 %    Platelets 215 150 - 379 x10E3/uL    Neutrophil Rel % 75 Not Estab. %    Lymphocyte Rel % 15 Not Estab. %    Monocyte Rel % 7 Not Estab. %    Eosinophil Rel % 2 Not Estab. %    Basophil Rel % 1 Not Estab. %    Neutrophils Absolute 7.1 (H) 1.4 - 7.0 x10E3/uL    Lymphocytes Absolute 1.4 0.7 - 3.1 x10E3/uL    Monocytes Absolute 0.7 0.1 - 0.9 x10E3/uL    Eosinophils Absolute 0.2 0.0 - 0.4 x10E3/uL    Basophils Absolute 0.1 0.0 - 0.2 x10E3/uL    Immature Granulocyte Rel % 0 Not Estab. %    Immature Grans Absolute  0.0 0.0 - 0.1 x10E3/uL       Assessment/Plan   Sanjuanita was seen today for follow-up and anxiety.    Diagnoses and all orders for this visit:    Impaired fasting glucose    Familial hypercholesterolemia    Anxiety    Smoking addiction    Other orders  -     Flu Vaccine High Dose PF 65YR+  -     ALPRAZolam (XANAX) 0.5 MG tablet; Take 1 tablet by mouth 2 (Two) Times a Day As Needed.  -     ezetimibe (ZETIA) 10 MG tablet; Take 1 tablet by mouth Daily.        For her anxiety, will reduce Xanax to BID and follow up after the new year. At that time, will try to go down to 0.25mg PO BID or 0.5mg PO once per day. She wants to get off of this and I think we can do this over the next 6 moths. She is still grieving her , but is doing well and has good social network. Going to grief group at [facility].     Patient's BG's have been elevated and their HgA1c is 6%. Will continue to focus on diet and exercise. Will follow up in 6 months.    Her cholesterol is lower, but due due to her smoking risk, I think that that we should start medication. She has not tolerated statins in the past (multiple that Dr. Ruffin tried and I tried Livalo). She has tolerated Zetia in the past, but due to expense several years ago, had to stop. Will send in and have Pat do a PA for this.     Once she has weaned her Xanax, we will work on smoking cessation.      Next time that she comes, we will do AWV and discuss her mammogram and DEXA scan as well as CXR and PFT's (to evaluate her lung function with smoking).       Return in about 2 months (around 1/1/2018) for Recheck of anxiety .    Hedy Gonzalez MD  10/20/2017

## 2017-11-20 ENCOUNTER — TELEPHONE (OUTPATIENT)
Dept: INTERNAL MEDICINE | Facility: CLINIC | Age: 72
End: 2017-11-20

## 2017-11-20 RX ORDER — PAROXETINE HYDROCHLORIDE 20 MG/1
20 TABLET, FILM COATED ORAL EVERY MORNING
Qty: 30 TABLET | Refills: 0 | Status: SHIPPED | OUTPATIENT
Start: 2017-11-20 | End: 2017-12-18

## 2017-11-20 NOTE — TELEPHONE ENCOUNTER
"Patient has been advised and voiced understanding. RX Paxil sent to local pharmacy.     ----- Message from Hedy Gonzalez MD sent at 11/14/2017 11:16 AM EST -----  Regarding: RE: ANXIETY ISSUE  Contact: [phone]  I agree and we have talked about this in the past. I would start her on 20mg of Paxil PO qam and see how she does. It will not take affect for about 4-6 weeks fully, but hopefully she will notice some difference after one week. Reassure her that this is normal to feel this way.   ----- Message -----     From: Monica Peraza MA     Sent: 11/14/2017   9:16 AM       To: Hedy Gonzalez MD  Subject: RE: ANXIETY ISSUE                                Patient states that she has attempted to go down to 1 Xanax daily, without relief later in the day she takes 1 more, and then later a 3rd (if needed). The patient states she is a nervous wreck and would like to try another medication if possible. The patient states that she has discussed this with friends who have gone through the same and has been told there are other options of medications. The patient also voiced she is worried she will get addicted to xanax even though \"it does not seem to be helping me\".     ----- Message -----     From: Hedy Gonzalez MD     Sent: 11/13/2017   1:06 PM       To: Monica Peraza MA  Subject: FW: ANXIETY ISSUE                                Can you call her and see how she is doing? We were hoping to go down on Xanax and it sounds like she is not getting better    ----- Message -----     From: Soo Martinez MA     Sent: 11/13/2017  11:41 AM       To: Hedy Gonzalez MD  Subject: FW: ANXIETY ISSUE                                ----- Message -----     From: Brisa Dietz     Sent: 11/13/2017  11:20 AM       To: [facility]  Subject: ANXIETY ISSUE                                    VALENTIN PT    PT WANTED TO CALL TO DISCUSS HER ANXIETY MED. SHE DOES NOT THINK THAT THE ZANAX IS HELPING HER. PLEASE " CALL PT BACK    CELL -[phone]

## 2017-12-18 RX ORDER — PAROXETINE HYDROCHLORIDE 20 MG/1
20 TABLET, FILM COATED ORAL EVERY MORNING
Qty: 30 TABLET | Refills: 6 | Status: SHIPPED | OUTPATIENT
Start: 2017-12-18 | End: 2017-12-18

## 2018-01-11 ENCOUNTER — OFFICE VISIT (OUTPATIENT)
Dept: INTERNAL MEDICINE | Facility: CLINIC | Age: 73
End: 2018-01-11

## 2018-01-11 VITALS
RESPIRATION RATE: 16 BRPM | OXYGEN SATURATION: 97 % | SYSTOLIC BLOOD PRESSURE: 116 MMHG | DIASTOLIC BLOOD PRESSURE: 82 MMHG | WEIGHT: 129 LBS | BODY MASS INDEX: 21.49 KG/M2 | TEMPERATURE: 97.8 F | HEIGHT: 65 IN | HEART RATE: 75 BPM

## 2018-01-11 DIAGNOSIS — R73.01 IMPAIRED FASTING GLUCOSE: ICD-10-CM

## 2018-01-11 DIAGNOSIS — F17.209 NICOTINE DEPENDENCE WITH NICOTINE-INDUCED DISORDER, UNSPECIFIED NICOTINE PRODUCT TYPE: ICD-10-CM

## 2018-01-11 DIAGNOSIS — Z00.00 MEDICARE ANNUAL WELLNESS VISIT, SUBSEQUENT: Primary | ICD-10-CM

## 2018-01-11 DIAGNOSIS — E78.01 FAMILIAL HYPERCHOLESTEROLEMIA: ICD-10-CM

## 2018-01-11 DIAGNOSIS — F41.9 ANXIETY: ICD-10-CM

## 2018-01-11 DIAGNOSIS — Z63.4 EXPECTED BEREAVEMENT DUE TO LIFE EVENT: ICD-10-CM

## 2018-01-11 PROCEDURE — G0439 PPPS, SUBSEQ VISIT: HCPCS | Performed by: INTERNAL MEDICINE

## 2018-01-11 PROCEDURE — 99214 OFFICE O/P EST MOD 30 MIN: CPT | Performed by: INTERNAL MEDICINE

## 2018-01-11 PROCEDURE — 99406 BEHAV CHNG SMOKING 3-10 MIN: CPT | Performed by: INTERNAL MEDICINE

## 2018-01-11 SDOH — SOCIAL STABILITY - SOCIAL INSECURITY: DISSAPEARANCE AND DEATH OF FAMILY MEMBER: Z63.4

## 2018-01-11 NOTE — PROGRESS NOTES
QUICK REFERENCE INFORMATION:  The ABCs of the Annual Wellness Visit    Subsequent Medicare Wellness Visit    HEALTH RISK ASSESSMENT    1945    Recent Hospitalizations:  No hospitalization(s) within the last year..        Current Medical Providers:  Patient Care Team:  Hedy Gonzalez MD as PCP - General (Internal Medicine)  Hedy Gonzalez MD as PCP - Claims Attributed  Karey Menjivar MD as Consulting Physician (Obstetrics and Gynecology)        Smoking Status:  History   Smoking Status   • Current Every Day Smoker   • Packs/day: 0.50   • Types: Cigarettes   Smokeless Tobacco   • Never Used       Alcohol Consumption:  History   Alcohol Use   • 1.2 oz/week   • 2 Glasses of wine per week       Depression Screen:   PHQ-2/PHQ-9 Depression Screening 1/11/2018   Little interest or pleasure in doing things 1   Feeling down, depressed, or hopeless 1   Trouble falling or staying asleep, or sleeping too much 0   Feeling tired or having little energy 1   Poor appetite or overeating 0   Feeling bad about yourself - or that you are a failure or have let yourself or your family down 0   Trouble concentrating on things, such as reading the newspaper or watching television 0   Moving or speaking so slowly that other people could have noticed. Or the opposite - being so fidgety or restless that you have been moving around a lot more than usual 0   Thoughts that you would be better off dead, or of hurting yourself in some way 0   Total Score 3   If you checked off any problems, how difficult have these problems made it for you to do your work, take care of things at home, or get along with other people? Somewhat difficult       Health Habits and Functional and Cognitive Screening:  Functional & Cognitive Status 1/11/2018   Do you have difficulty preparing food and eating? No   Do you have difficulty bathing yourself, getting dressed or grooming yourself? No   Do you have difficulty using the toilet? No   Do you have difficulty  moving around from place to place? No   Do you have trouble with steps or getting out of a bed or a chair? No   In the past year have you fallen or experienced a near fall? No   Current Diet Well Balanced Diet   Dental Exam Up to date   Eye Exam Up to date   Exercise (times per week) 6 times per week   Current Exercise Activities Include Walking and ballet   Do you need help using the phone?  No   Are you deaf or do you have serious difficulty hearing?  Yes   Do you need help with transportation? No   Do you need help shopping? No   Do you need help preparing meals?  No   Do you need help with housework?  No   Do you need help with laundry? No   Do you need help taking your medications? No   Do you need help managing money? No   Have you felt unusual stress, anger or loneliness in the last month? Yes   Who do you live with? Alone   If you need help, do you have trouble finding someone available to you? No   Have you been bothered in the last four weeks by sexual problems? No   Do you have difficulty concentrating, remembering or making decisions? No           Does the patient have evidence of cognitive impairment? No    Aspirin use counseling: Start ASA 81 mg daily       Recent Lab Results:  CMP:  Lab Results   Component Value Date     (H) 10/03/2017    BUN 17 10/03/2017    CREATININE 1.08 (H) 10/03/2017    EGFRIFNONA 51 (L) 10/03/2017    EGFRIFAFRI 59 (L) 10/03/2017    BCR 16 10/03/2017     10/03/2017    K 4.7 10/03/2017    CO2 25 10/03/2017    CALCIUM 10.0 10/03/2017    PROTENTOTREF 7.8 10/03/2017    ALBUMIN 4.8 10/03/2017    LABGLOBREF 3.0 10/03/2017    LABIL2 1.6 10/03/2017    BILITOT 0.8 10/03/2017    ALKPHOS 100 10/03/2017    AST 19 10/03/2017    ALT 13 10/03/2017     Lipid Panel:  Lab Results   Component Value Date    TRIG 141 10/03/2017    HDL 75 10/03/2017    VLDL 28 10/03/2017    LDLHDL 2.45 03/24/2017     HbA1c:  Lab Results   Component Value Date    HGBA1C 6.0 (H) 10/03/2017       Visual  "Acuity:  No exam data present    Age-appropriate Screening Schedule:  Refer to the list below for future screening recommendations based on patient's age, sex and/or medical conditions. Orders for these recommended tests are listed in the plan section. The patient has been provided with a written plan.    Health Maintenance   Topic Date Due   • DXA SCAN  10/27/2016   • TDAP/TD VACCINES (2 - Td) 2018   • LIPID PANEL  10/03/2018   • MAMMOGRAM  2018   • COLONOSCOPY  2020   • INFLUENZA VACCINE  Completed   • PNEUMOCOCCAL VACCINES (65+ LOW/MEDIUM RISK)  Completed   • ZOSTER VACCINE  Completed        Subjective   History of Present Illness    Sanjuanita Bowen is a 72 y.o. female who presents for an Subsequent Wellness Visit.    She says that she feels good physically, but mentally she is having hard time going places. She feels that her anxiety is worse now that she is having to change records and bills into her name after her  . She is looking at condos to move into. Her weight is stable. She takes 0.5mg of Xanax once to twice per day. Sometimes on rare occasions, she takes three. She never takes more than three. She states that she has \"fear\" and takes deep breaths to help calm herself down. She is sleeping well. 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 She doesn't want to see a therapist now, but will consider in the summer. She is not involved in a [facility] group at [facility] yet, but is going to start in February.     She is on Zetia and tolerating well. She is still smoking and is on a baby ASA. Her last LDL was 156.     She has an appointment with Dr. Karey Menjivar tomorrow and is going to have mammogram and I have asked her to do a bone scan as well due to her smoking history.     She is up to date on her vaccines.     The following portions of the patient's history were reviewed and updated as " "appropriate: allergies, current medications, past family history, past medical history, past social history, past surgical history and problem list.    Outpatient Medications Prior to Visit   Medication Sig Dispense Refill   • ALPRAZolam (XANAX) 0.5 MG tablet Take 1 tablet by mouth 2 (Two) Times a Day As Needed. 90 tablet 2   • aspirin 81 MG EC tablet Take 81 mg by mouth daily.     • Cholecalciferol (VITAMIN D PO) Take 1 tablet by mouth daily.     • Cyanocobalamin (VITAMIN B12 PO) Take  by mouth.     • ezetimibe (ZETIA) 10 MG tablet Take 1 tablet by mouth Daily. 30 tablet 3   • Omega-3 Fatty Acids (FISH OIL) 1000 MG capsule capsule Take 1 capsule by mouth.     • vitamin E 400 UNIT capsule Take 400 Units by mouth Daily.       No facility-administered medications prior to visit.        Patient Active Problem List   Diagnosis   • Anxiety   • Hyperlipidemia   • Low back pain   • History of repair of hip joint   • Osteoarthritis of hip   • Smoking addiction   • Impaired fasting glucose       Advance Care Planning:  has an advance directive - a copy HAS NOT been provided. Have asked the patient to send this to us to add to record.    Identification of Risk Factors:  Risk factors include: unhealthy diet, cardiovascular risk, tobacco use, increased fall risk, isolation, depression, hearing limitations and polypharmacy.    Review of Systems    Compared to one year ago, the patient feels her physical health is the same.  Compared to one year ago, the patient feels her mental health is worse.    Objective     Physical Exam    Vitals:    01/11/18 0927   BP: 116/82   BP Location: Left arm   Patient Position: Sitting   Cuff Size: Adult   Pulse: 75   Resp: 16   Temp: 97.8 °F (36.6 °C)   TempSrc: Oral   SpO2: 97%   Weight: 58.5 kg (129 lb)   Height: 165.1 cm (65\")       Body mass index is 21.47 kg/(m^2).  Discussed the patient's BMI with her. BMI is within normal parameters. No follow-up required.    Assessment/Plan   Patient " Self-Management and Personalized Health Advice  The patient has been provided with information about: diet, exercise, tobacco cessation, fall prevention, designing advance directives and mental health concerns and preventive services including:   · Advance directive, Bone densitometry screening, Diabetes screening, see lab orders, Exercise counseling provided, Fall Risk assessment done, Fall Risk plan of care done, Nutrition counseling provided, Screening mammography, referral placed, Smoking cessation counseling completed.    Visit Diagnoses:    ICD-10-CM ICD-9-CM   1. Medicare annual wellness visit, subsequent Z00.00 V70.0   2. Anxiety F41.9 300.00   3. Expected bereavement due to life event Z63.4 V62.89   4. Familial hypercholesterolemia E78.01 272.0   5. Impaired fasting glucose R73.01 790.21   6. Nicotine dependence with nicotine-induced disorder, unspecified nicotine product type F17.209 292.9     305.1     Her anxiety is slightly worse, but expected given that her   a short time ago. She agreeable to go down to 0.25mg PO BID PRN Xanax and will follow up in 3 months and sooner if she needs to be seen. Encouraged her to work outside the home, set a schedule and start therapy sessions at [facility] and she is agreeable.     Will recheck HgA1c and CMP fasting to follow her IFG. Her BG was 126 on her last check and I am worried that she is going to develop diabetes. Will monitor and talk to her about Metformin which she has not wanted to start in the past for her IFG.     She has done well on her Zetia as she has not tolerated statins in the past due to severe muscle cramps. I am hoping that this has lowered her cholesterol. She understands that she is high risk for MI/stroke due to her IFG, HLD and her smoking. Not willing to quit at this time. Spent 5 minutes in counseling discussing smoking cessation and options today.         Orders Placed This Encounter   Procedures   • Comprehensive Metabolic Panel    • Hemoglobin A1c   • Lipid Panel With LDL / HDL Ratio       Outpatient Encounter Prescriptions as of 1/11/2018   Medication Sig Dispense Refill   • ALPRAZolam (XANAX) 0.5 MG tablet Take 1 tablet by mouth 2 (Two) Times a Day As Needed. 90 tablet 2   • aspirin 81 MG EC tablet Take 81 mg by mouth daily.     • Cholecalciferol (VITAMIN D PO) Take 1 tablet by mouth daily.     • Cyanocobalamin (VITAMIN B12 PO) Take  by mouth.     • ezetimibe (ZETIA) 10 MG tablet Take 1 tablet by mouth Daily. 30 tablet 3   • Omega-3 Fatty Acids (FISH OIL) 1000 MG capsule capsule Take 1 capsule by mouth.     • vitamin E 400 UNIT capsule Take 400 Units by mouth Daily.       No facility-administered encounter medications on file as of 1/11/2018.        Reviewed use of high risk medication in the elderly: yes  Reviewed for potential of harmful drug interactions in the elderly: yes    Follow Up:  Return in about 3 months (around 4/11/2018) for Recheck.     An After Visit Summary and PPPS with all of these plans were given to the patient.

## 2018-01-11 NOTE — PATIENT INSTRUCTIONS
Medicare Wellness  Personal Prevention Plan of Service     Date of Office Visit:  2018  Encounter Provider:  Hedy Gonzalez MD  Place of Service:  [facility]  Patient Name: Sanjuanita Bowen  :  1945    As part of the Medicare Wellness portion of your visit today, we are providing you with this personalized preventive plan of services (PPPS). This plan is based upon recommendations of the [place] Preventive Services Task Force (USPSTF) and the Advisory Committee on Immunization Practices (ACIP).    This lists the preventive care services that should be considered, and provides dates of when you are due. Items listed as completed are up-to-date and do not require any further intervention.    Health Maintenance   Topic Date Due   • DXA SCAN  10/27/2016   • TDAP/TD VACCINES (2 - Td) 2018   • LIPID PANEL  10/03/2018   • MAMMOGRAM  2018   • MEDICARE ANNUAL WELLNESS  2019   • COLONOSCOPY  2020   • INFLUENZA VACCINE  Completed   • PNEUMOCOCCAL VACCINES (65+ LOW/MEDIUM RISK)  Completed   • ZOSTER VACCINE  Completed       Orders Placed This Encounter   Procedures   • Comprehensive Metabolic Panel   • Hemoglobin A1c   • Lipid Panel With LDL / HDL Ratio       Return in about 3 months (around 2018) for Recheck.

## 2018-01-12 ENCOUNTER — TELEPHONE (OUTPATIENT)
Dept: INTERNAL MEDICINE | Facility: CLINIC | Age: 73
End: 2018-01-12

## 2018-01-12 RX ORDER — ALPRAZOLAM 0.25 MG/1
0.25 TABLET ORAL 2 TIMES DAILY PRN
Qty: 60 TABLET | Refills: 0
Start: 2018-01-12 | End: 2018-01-12 | Stop reason: SDUPTHER

## 2018-01-12 RX ORDER — ALPRAZOLAM 0.25 MG/1
0.25 TABLET ORAL 2 TIMES DAILY PRN
Qty: 60 TABLET | Refills: 0
Start: 2018-01-12 | End: 2018-01-15 | Stop reason: SDUPTHER

## 2018-01-12 NOTE — TELEPHONE ENCOUNTER
----- Message from Hedy Gonzalez MD sent at 1/11/2018  1:02 PM EST -----  When she ask for request, can we send this in for her to [facility]. I want to reduce to 0.25 BID PRN on the Xanax too. This will be coming through in 2 weeks.   ===View-only below this line===    ----- Message -----     From: Sanjuanita CALLIE Bowen     Sent: 1/11/2018  12:40 PM       To: Hedy Gonzalez MD  Subject: Prescription Question                            Hedy, riding home from appt today got to thinking of your suggestion to reduce dosage of Zanax and think I'd like to do that!  Reduce dosage, but still able to take from 1 to 2 or 3, if needed.  Prescription isn't due to renew until 1/26/18 so could we set that up with my pharmacy [facility]?  Also, my prescription coverage is with [employer] and not sure how I set that up for 90 day supply on the Zetia prescription and/or Zanax- will speak w/pharmacy [facility] about that and let you guys know too - pretty sure when it's set up for mail delivery and 90 day supply prescriptions needs to be sent to them directly.  Will let you know what I find out.  In the mean time, until I know, [facility] will still be on my chart.  Again, thanks for your help today - just talking with you helped me!  One more thing, cancelled my appt tomorrow with Dr Menjivar/Mammogram due to possible weather issues - it's been rescheduled for 2/15/18.  Again, thanks for your help!  Enjoy Rubi!!!    Updated med list  To reflect new dose

## 2018-01-15 RX ORDER — ALPRAZOLAM 0.25 MG/1
0.25 TABLET ORAL 2 TIMES DAILY PRN
Qty: 180 TABLET | Refills: 0 | Status: SHIPPED | OUTPATIENT
Start: 2018-01-15 | End: 2018-04-05 | Stop reason: DRUGHIGH

## 2018-01-18 ENCOUNTER — TELEPHONE (OUTPATIENT)
Dept: INTERNAL MEDICINE | Facility: CLINIC | Age: 73
End: 2018-01-18

## 2018-01-18 NOTE — TELEPHONE ENCOUNTER
----- Message from Hedy Gonzalez MD sent at 1/15/2018  3:23 PM EST -----  BID PRN please   ----- Message -----     From: Soo Martinez MA     Sent: 1/15/2018   3:17 PM       To: Hedy Gonzalez MD    PT WANTS TO GET HER  ALPRAZOLAM AT MAIL ORDER THAT SHE GOT SET UP.  OK TO PRINT FOR 90 DAYS   FOR THE ALPRAZOLAM  0.25 MG  BID  #180    ----- Message -----     From: Soo Martinez MA     Sent: 1/12/2018  11:39 AM       To: Soo Martinez MA     [facility]    Fax  [phone]   Phone #  [phone]    90 days supply  zetia   teir  # 4  ----- Message -----     From: Hedy Gonzalez MD     Sent: 1/12/2018   9:35 AM       To: Soo Martinez MA    I would start out with twice per day and if she needs more.   ----- Message -----     From: Soo Martinez MA     Sent: 1/12/2018   9:32 AM       To: Hedy Gonzalez MD    Pt wants to know on the new dose of medication if she need to take 3 in one day if that going to be a problem.  Stated this was discussed at ov.    ----- Message -----     From: Hedy Gonzalez MD     Sent: 1/11/2018   1:02 PM       To: Soo Martinez MA    When she ask for request, can we send this in for her to [facility]. I want to reduce to 0.25 BID PRN on the Xanax too. This will be coming through in 2 weeks.   ===View-only below this line===    ----- Message -----     From: Sanjuanita Bowen     Sent: 1/11/2018  12:40 PM       To: Hedy Gonzalez MD  Subject: Prescription Question                            Hedy, riding home from appt today got to thinking of your suggestion to reduce dosage of Zanax and think I'd like to do that!  Reduce dosage, but still able to take from 1 to 2 or 3, if needed.  Prescription isn't due to renew until 1/26/18 so could we [facility]?  Also, my prescription coverage is with [employer] and not sure how I set that up for 90 day supply on the Zetia prescription and/or Zanax- will speak w[facility]acy [facility] about that and let  you guys know too - pretty sure when it's set up for mail delivery and 90 day supply prescriptions needs to be sent to them directly.  Will let you know what I find out.  In the mean time, until I know, [facility]  Again, thanks for your help today - just talking with you helped me!  One more thing, cancelled my appt tomorrow with Dr Menjivar/Mammogram due to possible weather issues - it's been rescheduled for 2/15/18.  Again, thanks for your help!  Enjoy Rubi!!!              FAXED TO  [employer]

## 2018-01-18 NOTE — TELEPHONE ENCOUNTER
----- Message from Hedy Gonzalez MD sent at 1/15/2018  3:23 PM EST -----  BID PRN please   ----- Message -----     From: Soo Martinez MA     Sent: 1/15/2018   3:17 PM       To: Hedy Gonzalez MD    PT WANTS TO GET HER  ALPRAZOLAM AT MAIL ORDER THAT SHE GOT SET UP.  OK TO PRINT FOR 90 DAYS   FOR THE ALPRAZOLAM  0.25 MG  BID  #180    ----- Message -----     From: Soo Martinez MA     Sent: 1/12/2018  11:39 AM       To: Soo Martinez MA     [facility]  [facility]    Fax  [phone]   Phone #  [phone]    90 days supply  zetia   teir  # 4  ----- Message -----     From: Hedy Gonzalez MD     Sent: 1/12/2018   9:35 AM       To: Soo Martinez MA    I would start out with twice per day and if she needs more.   ----- Message -----     From: Soo Martinez MA     Sent: 1/12/2018   9:32 AM       To: Hedy Gonzalez MD    Pt wants to know on the new dose of medication if she need to take 3 in one day if that going to be a problem.  Stated this was discussed at ov.    ----- Message -----     From: Hedy Gonzalez MD     Sent: 1/11/2018   1:02 PM       To: Soo Martinez MA    When she ask for request, can we send this in for her to [facility]. I want to reduce to 0.25 BID PRN on the Xanax too. This will be coming through in 2 weeks.   ===View-only below this line===    ----- Message -----     From: Sanjuanita Bowen     Sent: 1/11/2018  12:40 PM       To: Hedy Gonzalez MD  Subject: Prescription Question                            Hedy, riding home from appt today got to thinking of your suggestion to reduce dosage of Zanax and think I'd like to do that!  Reduce dosage, but still able to take from 1 to 2 or 3, if needed.  Prescription isn't due to renew until 1/26/18 so could we [facility] that [facility]?  Also, my prescription coverage is with [employer] and not sure how I set that up for 90 day supply on the Zetia prescription and/or Zanax- will speak [facility]acy [facility] about that and let  you guys know too - pretty sure when it's set up for mail delivery and 90 day supply prescriptions needs to be sent to them directly.  Will let you know what I find out.  In the mean time, until I know, [facility] will still be on my chart.  Again, thanks for your help today - just talking with you helped me!  One more thing, cancelled my appt tomorrow with Dr Menjivar/Mammogram due to possible weather issues - it's been rescheduled for 2/15/18.  Again, thanks for your help!  Enjoy Rubi!!!              SENT TO [facility]

## 2018-01-19 ENCOUNTER — TELEPHONE (OUTPATIENT)
Dept: INTERNAL MEDICINE | Facility: CLINIC | Age: 73
End: 2018-01-19

## 2018-01-19 RX ORDER — EZETIMIBE 10 MG/1
10 TABLET ORAL DAILY
Qty: 30 TABLET | Refills: 3 | Status: SHIPPED | OUTPATIENT
Start: 2018-01-19 | End: 2018-04-16 | Stop reason: SDUPTHER

## 2018-01-19 NOTE — TELEPHONE ENCOUNTER
----- Message from Hedy Gonzalez MD sent at 1/15/2018  8:13 AM EST -----  Just wanted to make sure that you received this info. I think that you have. Hoping that maybe we can get her into patient assistance program. She has not tolerated statins in the past.       ----- Message -----     From: Sanjuanita Bowen     Sent: 1/14/2018 12:07 PM EST       To: Hedy Gonzalez MD  Subject: RE: Prescription Question    Hedy, in setting up Mail order w/[facility], found out that the cholesteral meds (Ezetimibe 10 mg) has moved to a tier 4 medication.  Been paying $45/month will be going up to $90/month or $284.59 for a 90 day supply - way over my budget.  They set up a Case # [id number] to review and should be touching base w/you regarding this.  In the meantime, I only have one more week of this medication.  My Zanax will remain the same, if not a little less, for a 90 day supply $32.50/90 day supply so I'm okay there.  Have set up a mail order account;  Fax #[phone]; Phone:  [phone] for refills, etc. - hope you can help me with this issue regarding Cholesteral meds????  I have lab work in a couple of weeks and I'll guess we'll find out if it is lowering my cholesteral, etc.  Thanks for your help,also gave this info to your prescription staff memeber Bere Martinez.  Thanks for your help.  Sanjuanita Bowen  ----- Message -----  From: Hedy Gonzalez MD  Sent: 1/11/2018  1:01 PM EST  To: Sanjuanita Bowen  Subject: RE: Prescription Question    I got your message and we will take care of this for you. I'm forwarding to our prescription staff member Bere Martinez, so when we get the request we will send to [facility]     ----- Message -----     From: Sanjuanita Bowen     Sent: 1/11/2018 12:40 PM EST       To: Hedy Gonzalez MD  Subject: Prescription Question    Hedy, riding home from appt today got to thinking of your suggestion to reduce dosage of Zanax and think I'd like to do that!  Reduce dosage, but still able to take from 1  to 2 or 3, if needed.  Prescription isn't due to renew until 1/26/18 so could we [facility] that [facility]?  Also, my prescription coverage is with [employer] and not sure how I set that up for 90 day supply on the Zetia prescription and/or Zanax- will speak [facility] about that and let you guys know too - pretty sure when it's set up for mail delivery and 90 day supply prescriptions needs to be sent to them directly.  Will let you know what I find out.  In the mean time, until I know, [facility].  Again, thanks for your help today - just talking with you helped me!  One more thing, cancelled my appt tomorrow with Dr Menjivar/Mammogram due to possible weather issues - it's been rescheduled for 2/15/18.  Again, thanks for your help!  Enjoy Rubi!!!      I GOT TEIR EXCEPTION LEFT PT MESSAGE TO LET ME KNOW IF SHE WANTS THIS SENT TO  MAIL ORDER.

## 2018-01-26 PROBLEM — N18.30 CKD (CHRONIC KIDNEY DISEASE) STAGE 3, GFR 30-59 ML/MIN (HCC): Status: ACTIVE | Noted: 2018-01-26

## 2018-01-26 LAB
ALBUMIN SERPL-MCNC: 4.3 G/DL (ref 3.5–4.8)
ALBUMIN/GLOB SERPL: 1.8 {RATIO} (ref 1.2–2.2)
ALP SERPL-CCNC: 87 IU/L (ref 39–117)
ALT SERPL-CCNC: 10 IU/L (ref 0–32)
AST SERPL-CCNC: 15 IU/L (ref 0–40)
BILIRUB SERPL-MCNC: 0.5 MG/DL (ref 0–1.2)
BUN SERPL-MCNC: 20 MG/DL (ref 8–27)
BUN/CREAT SERPL: 19 (ref 12–28)
CALCIUM SERPL-MCNC: 9.4 MG/DL (ref 8.7–10.3)
CHLORIDE SERPL-SCNC: 106 MMOL/L (ref 96–106)
CHOLEST SERPL-MCNC: 164 MG/DL (ref 100–199)
CO2 SERPL-SCNC: 23 MMOL/L (ref 18–29)
CREAT SERPL-MCNC: 1.08 MG/DL (ref 0.57–1)
GFR SERPLBLD CREATININE-BSD FMLA CKD-EPI: 51 ML/MIN/1.73
GFR SERPLBLD CREATININE-BSD FMLA CKD-EPI: 59 ML/MIN/1.73
GLOBULIN SER CALC-MCNC: 2.4 G/DL (ref 1.5–4.5)
GLUCOSE SERPL-MCNC: 113 MG/DL (ref 65–99)
HBA1C MFR BLD: 5.9 % (ref 4.8–5.6)
HDLC SERPL-MCNC: 57 MG/DL
LDLC SERPL CALC-MCNC: 95 MG/DL (ref 0–99)
LDLC/HDLC SERPL: 1.7 RATIO UNITS (ref 0–3.2)
POTASSIUM SERPL-SCNC: 4.5 MMOL/L (ref 3.5–5.2)
PROT SERPL-MCNC: 6.7 G/DL (ref 6–8.5)
SODIUM SERPL-SCNC: 146 MMOL/L (ref 134–144)
TRIGL SERPL-MCNC: 58 MG/DL (ref 0–149)
VLDLC SERPL CALC-MCNC: 12 MG/DL (ref 5–40)

## 2018-01-26 NOTE — PROGRESS NOTES
Called patient with labs and her cholesterol is much better. Continue Zetia. See back as scheduled for repeat labs to follow [facility].

## 2018-02-15 ENCOUNTER — APPOINTMENT (OUTPATIENT)
Dept: WOMENS IMAGING | Facility: HOSPITAL | Age: 73
End: 2018-02-15

## 2018-02-15 PROCEDURE — 77063 BREAST TOMOSYNTHESIS BI: CPT | Performed by: RADIOLOGY

## 2018-02-15 PROCEDURE — 77067 SCR MAMMO BI INCL CAD: CPT | Performed by: RADIOLOGY

## 2018-03-19 PROBLEM — F41.9 SEVERE ANXIETY: Status: ACTIVE | Noted: 2018-03-19

## 2018-03-19 PROBLEM — F32.2 SEVERE DEPRESSION (HCC): Status: ACTIVE | Noted: 2018-03-19

## 2018-03-20 ENCOUNTER — TELEPHONE (OUTPATIENT)
Dept: INTERNAL MEDICINE | Facility: CLINIC | Age: 73
End: 2018-03-20

## 2018-03-20 NOTE — TELEPHONE ENCOUNTER
I spoke to patient yesterday afternoon and advised all below in great detail. Patient voiced understanding. Patient has Medicare (Gen[employer] is $0). I explained all [employer] details to patient and patient would like to do Genesight, order placed yesterday- per Dr. Hanson and kit placed in mail this AM. Patient has been advised to contact office with any concerns, questions.     ----- Message from Hedy Hanson MD sent at 3/19/2018 12:02 PM EDT -----    Monica, can you take care of this call back to Mrs. Bowen.   She cannot take them 4 times per day. She needs to stop now and only take twice per day. It will be hard for about one week, but it's very risky to be taking that much. I would like her to consider doing some blood testing through [facility] to see if which anti-depressants /anxiety medications will work better with her body since she has not done well with them in the past. Monica has info on this and can through pricing with her,  no more than $130. These test help us know which medications work best. She needs to see me after testing is done.Monica can call her back today.     ----- Message -----  From: Soo Martinez MA  Sent: 3/19/2018  11:33 AM  To: Hedy Hanson MD    ----- Message -----  From: Hedy Gilliam  Sent: 3/19/2018  11:30 AM  To: [facility]    PT STATES SHE HAS BEEN TAKING 4 XANAX A DAY INSTEAD OF THE 2 A DAY LIKE PRESCRIBED. WANTS TO LET DR HANSON KNOW OF THIS AND KNOW WHAT SHE THINKS AND WHAT HER OPTIONS ARE. CALL BACK [phone].

## 2018-03-30 ENCOUNTER — TELEPHONE (OUTPATIENT)
Dept: INTERNAL MEDICINE | Facility: CLINIC | Age: 73
End: 2018-03-30

## 2018-03-30 NOTE — TELEPHONE ENCOUNTER
Hedy (front office) spoke to patient this afternoon. Patient states RX Xanax 0.25 mg was not at pharmacy. I advised that I called RX in to [facility] local pharm on 3/26/18. Hedy advised patient of above. I just spoke with [facility] pharm 3/30/18 and confirmed I called in RX Xanax 0.25mg on 3/26/18- earliest fill date 4/3/18 per pharm tech. I called patient back and advised, patient voiced understanding.

## 2018-04-05 RX ORDER — ALPRAZOLAM 0.5 MG/1
0.5 TABLET ORAL DAILY PRN
Qty: 30 TABLET | Refills: 0 | OUTPATIENT
Start: 2018-04-05 | End: 2018-04-13

## 2018-04-05 RX ORDER — VENLAFAXINE HYDROCHLORIDE 37.5 MG/1
37.5 CAPSULE, EXTENDED RELEASE ORAL DAILY
Qty: 30 CAPSULE | Refills: 2 | Status: SHIPPED | OUTPATIENT
Start: 2018-04-05 | End: 2018-04-13 | Stop reason: SINTOL

## 2018-04-05 NOTE — TELEPHONE ENCOUNTER
Dr. Gonzalez spoke with patient in detail.   RX Effexor XR 37.5 mg po q day  RX Xanax 0.5 mg po q day   Both sent/called in to local pharm.      ----- Message from Hedy Gonzalez MD sent at 4/3/2018  7:35 AM EDT -----  Call Mrs. Bowen and let her know that her [employer] testing is back and I have reviewed. It seems that she would do well with Pristiq, but we could also try Effexor for her anxiety too. Sometimes Pristiq is not covered because it is brand name only and may require us to try Effexor first. We can discuss at follow up or start one now to see how she tolerates it in the next week. Both are SNRI's which help raise serotonin as well as norepinephrine levels in the brain to help with both anxiety and depression.

## 2018-04-06 ENCOUNTER — TELEPHONE (OUTPATIENT)
Dept: INTERNAL MEDICINE | Facility: CLINIC | Age: 73
End: 2018-04-06

## 2018-04-06 NOTE — TELEPHONE ENCOUNTER
----- Message from Hedy Gonzalez MD sent at 4/5/2018  8:52 AM EDT -----  Called her this morning and addressed concerns. Starting Effexor.   ----- Message -----  From: Shahnaz Rodriguez MA  Sent: 4/4/2018   5:44 PM  To: Hedy Gonzalez MD    Patient has already used entire 90 day supply of Xanax prescribed on 1/15/18, and insurance will not cover refill.  Patient has been very anxious.  Is part of a grief counseling group, but does not see a psych of any kind.  Patient requests call from Dr. Gonzalez.

## 2018-04-13 ENCOUNTER — OFFICE VISIT (OUTPATIENT)
Dept: INTERNAL MEDICINE | Facility: CLINIC | Age: 73
End: 2018-04-13

## 2018-04-13 VITALS
BODY MASS INDEX: 21.23 KG/M2 | SYSTOLIC BLOOD PRESSURE: 126 MMHG | RESPIRATION RATE: 18 BRPM | TEMPERATURE: 98.4 F | HEART RATE: 88 BPM | WEIGHT: 127.4 LBS | HEIGHT: 65 IN | OXYGEN SATURATION: 97 % | DIASTOLIC BLOOD PRESSURE: 74 MMHG

## 2018-04-13 DIAGNOSIS — F41.9 SEVERE ANXIETY: Primary | ICD-10-CM

## 2018-04-13 PROCEDURE — 99214 OFFICE O/P EST MOD 30 MIN: CPT | Performed by: INTERNAL MEDICINE

## 2018-04-13 RX ORDER — ALPRAZOLAM 0.25 MG/1
0.25 TABLET ORAL DAILY PRN
Qty: 30 TABLET | Refills: 2 | Status: SHIPPED | OUTPATIENT
Start: 2018-04-13 | End: 2018-05-21 | Stop reason: SDUPTHER

## 2018-04-13 RX ORDER — ALPRAZOLAM 0.25 MG/1
0.25 TABLET ORAL DAILY PRN
Qty: 30 TABLET | Refills: 2 | Status: SHIPPED | OUTPATIENT
Start: 2018-04-13 | End: 2018-04-13 | Stop reason: SDUPTHER

## 2018-04-13 NOTE — PROGRESS NOTES
Sanjuanita Bowen is a 72 y.o. female, who presents with a chief complaint of   Chief Complaint   Patient presents with   • Follow-up     3 month f/u, fasting in office if labs are needed   • Anxiety       73 yo F here for follow up of her depression and anxiety. She has been using Xanax 0.5mg PO qday PRN for the last 3 weeks and has done fairly well. She took it twice per day a few days even though written differently. She wants to go down on her Xanax again to 0.25m . She is working on staying in her house. She is teaching a few hours per day. She is still smoking daily. Did not do well on Effexor, had increased anxiety as well jitters.          The following portions of the patient's history were reviewed and updated as appropriate: allergies, current medications, past family history, past medical history, past social history, past surgical history and problem list.    Allergies: Atorvastatin    Current Outpatient Prescriptions:   •  ALPRAZolam (XANAX) 0.25 MG tablet, Take 1 tablet by mouth Daily As Needed for Anxiety., Disp: 30 tablet, Rfl: 2  •  aspirin 81 MG EC tablet, Take 81 mg by mouth daily., Disp: , Rfl:   •  Cholecalciferol (VITAMIN D PO), Take 1 tablet by mouth daily., Disp: , Rfl:   •  Cyanocobalamin (VITAMIN B12 PO), Take  by mouth., Disp: , Rfl:   •  ezetimibe (ZETIA) 10 MG tablet, Take 1 tablet by mouth Daily., Disp: 30 tablet, Rfl: 3  •  Omega-3 Fatty Acids (FISH OIL) 1000 MG capsule capsule, Take 1 capsule by mouth., Disp: , Rfl:   •  vitamin E 400 UNIT capsule, Take 400 Units by mouth Daily., Disp: , Rfl:   Medications Discontinued During This Encounter   Medication Reason   • ALPRAZolam (XANAX) 0.5 MG tablet *Therapy completed   • ALPRAZolam (XANAX) 0.25 MG tablet Reorder   • venlafaxine XR (EFFEXOR XR) 37.5 MG 24 hr capsule Side effects       Review of Systems   Constitutional: Negative for chills and fever.   Respiratory: Negative for cough and shortness of breath.    Cardiovascular:  "Negative for chest pain and palpitations.   Psychiatric/Behavioral: Positive for agitation and behavioral problems. Negative for sleep disturbance. The patient is nervous/anxious.              /74 (BP Location: Left arm, Patient Position: Sitting, Cuff Size: Adult)   Pulse 88   Temp 98.4 °F (36.9 °C) (Oral)   Resp 18   Ht 165.1 cm (65\")   Wt 57.8 kg (127 lb 6.4 oz)   SpO2 97%   BMI 21.20 kg/m²       Physical Exam   Constitutional: She is oriented to person, place, and time. She appears well-developed and well-nourished. No distress.   HENT:   Head: Normocephalic and atraumatic.   Right Ear: External ear normal.   Left Ear: External ear normal.   Mouth/Throat: Oropharynx is clear and moist. No oropharyngeal exudate.   Eyes: Conjunctivae are normal. Right eye exhibits no discharge. Left eye exhibits no discharge. No scleral icterus.   Neck: Neck supple.   Pulmonary/Chest: Effort normal.   Lymphadenopathy:     She has no cervical adenopathy.   Neurological: She is alert and oriented to person, place, and time.   Skin: Skin is warm. No rash noted.   Psychiatric: Her behavior is normal. Her mood appears anxious. Cognition and memory are normal.   Nursing note and vitals reviewed.          Results for orders placed or performed in visit on 01/11/18   Comprehensive Metabolic Panel   Result Value Ref Range    Glucose 113 (H) 65 - 99 mg/dL    BUN 20 8 - 27 mg/dL    Creatinine 1.08 (H) 0.57 - 1.00 mg/dL    eGFR Non African Am 51 (L) >59 mL/min/1.73    eGFR African Am 59 (L) >59 mL/min/1.73    BUN/Creatinine Ratio 19 12 - 28    Sodium 146 (H) 134 - 144 mmol/L    Potassium 4.5 3.5 - 5.2 mmol/L    Chloride 106 96 - 106 mmol/L    Total CO2 23 18 - 29 mmol/L    Calcium 9.4 8.7 - 10.3 mg/dL    Total Protein 6.7 6.0 - 8.5 g/dL    Albumin 4.3 3.5 - 4.8 g/dL    Globulin 2.4 1.5 - 4.5 g/dL    A/G Ratio 1.8 1.2 - 2.2    Total Bilirubin 0.5 0.0 - 1.2 mg/dL    Alkaline Phosphatase 87 39 - 117 IU/L    AST (SGOT) 15 0 - 40 IU/L "    ALT (SGPT) 10 0 - 32 IU/L   Hemoglobin A1c   Result Value Ref Range    Hemoglobin A1C 5.9 (H) 4.8 - 5.6 %   Lipid Panel With LDL / HDL Ratio   Result Value Ref Range    Total Cholesterol 164 100 - 199 mg/dL    Triglycerides 58 0 - 149 mg/dL    HDL Cholesterol 57 >39 mg/dL    VLDL Cholesterol 12 5 - 40 mg/dL    LDL Cholesterol  95 0 - 99 mg/dL    LDL/HDL Ratio 1.7 0.0 - 3.2 ratio units           Sanjuanita was seen today for follow-up and anxiety.    Diagnoses and all orders for this visit:    Severe anxiety    Other orders  -     Discontinue: ALPRAZolam (XANAX) 0.25 MG tablet; Take 1 tablet by mouth Daily As Needed for Anxiety.  -     ALPRAZolam (XANAX) 0.25 MG tablet; Take 1 tablet by mouth Daily As Needed for Anxiety.      Will wean Xanax to 0.25mg PO qday PRN   Discussed with patient that she must take as I prescribe or I will no longer prescribe for her   I want her to stop Effexor and we will consider Pristiq in future   Does not want to start Pristiq now  See back in 3 months with labs that day   Spent 50% of 25 minutes in counseling regarding use of benzos, safety and follow up     Return in about 3 months (around 7/13/2018) for Recheck with labs the day of .    Hedy Gonzalez MD  04/13/2018

## 2018-04-16 RX ORDER — EZETIMIBE 10 MG/1
10 TABLET ORAL DAILY
Qty: 90 TABLET | Refills: 3 | Status: SHIPPED | OUTPATIENT
Start: 2018-04-16 | End: 2019-04-04 | Stop reason: SDUPTHER

## 2018-05-21 ENCOUNTER — OFFICE VISIT (OUTPATIENT)
Dept: INTERNAL MEDICINE | Facility: CLINIC | Age: 73
End: 2018-05-21

## 2018-05-21 VITALS
HEART RATE: 89 BPM | OXYGEN SATURATION: 96 % | WEIGHT: 129.6 LBS | HEIGHT: 65 IN | RESPIRATION RATE: 20 BRPM | SYSTOLIC BLOOD PRESSURE: 142 MMHG | TEMPERATURE: 98.7 F | BODY MASS INDEX: 21.59 KG/M2 | DIASTOLIC BLOOD PRESSURE: 82 MMHG

## 2018-05-21 DIAGNOSIS — F41.9 SEVERE ANXIETY: ICD-10-CM

## 2018-05-21 DIAGNOSIS — R00.2 HEART PALPITATIONS: ICD-10-CM

## 2018-05-21 DIAGNOSIS — F41.0 PANIC ATTACKS: Primary | ICD-10-CM

## 2018-05-21 DIAGNOSIS — I10 ESSENTIAL HYPERTENSION: ICD-10-CM

## 2018-05-21 PROCEDURE — 99214 OFFICE O/P EST MOD 30 MIN: CPT | Performed by: INTERNAL MEDICINE

## 2018-05-21 RX ORDER — ALPRAZOLAM 0.5 MG/1
0.5 TABLET ORAL DAILY PRN
Qty: 30 TABLET | Refills: 0 | Status: SHIPPED | OUTPATIENT
Start: 2018-05-21 | End: 2018-06-18 | Stop reason: SDUPTHER

## 2018-05-21 RX ORDER — BISOPROLOL FUMARATE 5 MG/1
2.5 TABLET, FILM COATED ORAL DAILY
Qty: 30 TABLET | Refills: 1 | Status: SHIPPED | OUTPATIENT
Start: 2018-05-21 | End: 2018-06-06 | Stop reason: SDUPTHER

## 2018-05-21 NOTE — PATIENT INSTRUCTIONS
Increase Xanax to 0.5mg   We will call about Dr. Kaba  Check BP and HR daily and call or send me Mychart in one year

## 2018-05-22 PROCEDURE — 93000 ELECTROCARDIOGRAM COMPLETE: CPT | Performed by: INTERNAL MEDICINE

## 2018-06-11 ENCOUNTER — TELEPHONE (OUTPATIENT)
Dept: INTERNAL MEDICINE | Facility: CLINIC | Age: 73
End: 2018-06-11

## 2018-06-11 NOTE — TELEPHONE ENCOUNTER
LM on patient personal VM:  Correct address/phone number.  [address], [place] [address]   Phone: [phone]      ----- Message from Hedy Gonzalez MD sent at 6/11/2018 12:58 PM EDT -----    Patient said that this was wrong address for [facility] that I gave her. Can we see where they are located and give her an up to date number please.     -------------------------------------------    I want you to call Dr. Valdovinos or Dr. Davis (both at [place]) and possibly [facility] and see if they will accept Medicare and if so go ahead and make an appointment. My partner has sent patient's to both of them and like them and I regularly send [employer] patients to [facility]. Good luck and I hope this works out.       [facility]   [address] [place] [address] [address], [place] [address]  Phone: [phone]    [facility]   [phone]

## 2018-06-18 RX ORDER — ALPRAZOLAM 0.5 MG/1
0.5 TABLET ORAL DAILY PRN
Qty: 30 TABLET | Refills: 0 | Status: SHIPPED | OUTPATIENT
Start: 2018-06-18 | End: 2018-07-03 | Stop reason: SDUPTHER

## 2018-07-03 RX ORDER — ALPRAZOLAM 0.5 MG/1
0.5 TABLET ORAL 2 TIMES DAILY
Qty: 60 TABLET | Refills: 0 | Status: SHIPPED | OUTPATIENT
Start: 2018-07-03 | End: 2018-08-07 | Stop reason: SDUPTHER

## 2018-07-09 ENCOUNTER — TELEPHONE (OUTPATIENT)
Dept: INTERNAL MEDICINE | Facility: CLINIC | Age: 73
End: 2018-07-09

## 2018-07-12 ENCOUNTER — OFFICE VISIT (OUTPATIENT)
Dept: INTERNAL MEDICINE | Facility: CLINIC | Age: 73
End: 2018-07-12

## 2018-07-12 VITALS
TEMPERATURE: 97.8 F | SYSTOLIC BLOOD PRESSURE: 126 MMHG | OXYGEN SATURATION: 95 % | RESPIRATION RATE: 18 BRPM | HEIGHT: 65 IN | BODY MASS INDEX: 21.36 KG/M2 | DIASTOLIC BLOOD PRESSURE: 84 MMHG | HEART RATE: 58 BPM | WEIGHT: 128.2 LBS

## 2018-07-12 DIAGNOSIS — Z79.899 HIGH RISK MEDICATION USE: ICD-10-CM

## 2018-07-12 DIAGNOSIS — R73.01 IMPAIRED FASTING GLUCOSE: ICD-10-CM

## 2018-07-12 DIAGNOSIS — E55.9 VITAMIN D DEFICIENCY: ICD-10-CM

## 2018-07-12 DIAGNOSIS — F17.200 SMOKING ADDICTION: ICD-10-CM

## 2018-07-12 DIAGNOSIS — N18.30 CKD (CHRONIC KIDNEY DISEASE) STAGE 3, GFR 30-59 ML/MIN (HCC): ICD-10-CM

## 2018-07-12 DIAGNOSIS — E78.01 FAMILIAL HYPERCHOLESTEROLEMIA: ICD-10-CM

## 2018-07-12 DIAGNOSIS — D75.1 SECONDARY POLYCYTHEMIA: ICD-10-CM

## 2018-07-12 DIAGNOSIS — I10 ESSENTIAL HYPERTENSION: ICD-10-CM

## 2018-07-12 DIAGNOSIS — F41.9 ANXIETY: Primary | ICD-10-CM

## 2018-07-12 PROCEDURE — 99214 OFFICE O/P EST MOD 30 MIN: CPT | Performed by: INTERNAL MEDICINE

## 2018-07-12 NOTE — PROGRESS NOTES
"      Sanjuanita Bowen is a 73 y.o. female, who presents with a chief complaint of   Chief Complaint   Patient presents with   • Follow-up     2 month f/u   • Anxiety       72 yo F here for follow up of anxiety and is doing well other than some tension related to her family and arguments. She is feeling better on Xanax 0.5mg twice per and needing it on most days. Dr. Candace Soriano is going to see her next month. She has not tolerated Paxil or SNRI. She states that the anxiety just \"overtakes\" her sometimes. 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    She is doing well on Zetia and her BB and feels well on this. BP has been labile, but good here.     She is still smoking and is resistant to quit.  She has not tried, but wants to in the future.          The following portions of the patient's history were reviewed and updated as appropriate: allergies, current medications, past family history, past medical history, past social history, past surgical history and problem list.    Allergies: Atorvastatin    Current Outpatient Prescriptions:   •  ALPRAZolam (XANAX) 0.5 MG tablet, Take 1 tablet by mouth 2 (Two) Times a Day., Disp: 60 tablet, Rfl: 0  •  aspirin 81 MG EC tablet, Take 81 mg by mouth daily., Disp: , Rfl:   •  bisoprolol (ZEBeta) 5 MG tablet, Take 1 tablet by mouth Daily., Disp: 90 tablet, Rfl: 2  •  Cholecalciferol (VITAMIN D PO), Take 1 tablet by mouth daily., Disp: , Rfl:   •  Cyanocobalamin (VITAMIN B12 PO), Take  by mouth., Disp: , Rfl:   •  ezetimibe (ZETIA) 10 MG tablet, Take 1 tablet by mouth Daily., Disp: 90 tablet, Rfl: 3  •  Omega-3 Fatty Acids (FISH OIL) 1000 MG capsule capsule, Take 1 capsule by mouth., Disp: , Rfl:   •  vitamin E 400 UNIT capsule, Take 400 Units by mouth Daily., Disp: , Rfl:   There are no discontinued medications.    Review of Systems   Constitutional: Negative for fatigue and fever.   Respiratory: Negative for cough and shortness of breath.  " "  Gastrointestinal: Negative for diarrhea, nausea and vomiting.   Skin: Negative for rash.   Psychiatric/Behavioral: Positive for behavioral problems and decreased concentration. Negative for confusion and sleep disturbance. The patient is nervous/anxious.              /84 (BP Location: Left arm, Patient Position: Sitting, Cuff Size: Adult)   Pulse 58   Temp 97.8 °F (36.6 °C) (Oral)   Resp 18   Ht 165.1 cm (65\")   Wt 58.2 kg (128 lb 3.2 oz)   SpO2 95%   BMI 21.33 kg/m²       Physical Exam   Constitutional: She is oriented to person, place, and time. She appears well-developed and well-nourished. No distress.   HENT:   Head: Normocephalic and atraumatic.   Right Ear: External ear normal.   Left Ear: External ear normal.   Mouth/Throat: Oropharynx is clear and moist. No oropharyngeal exudate.   Eyes: Conjunctivae are normal. Right eye exhibits no discharge. Left eye exhibits no discharge. No scleral icterus.   Neck: Neck supple.   Pulmonary/Chest: Effort normal.   Lymphadenopathy:     She has no cervical adenopathy.   Neurological: She is alert and oriented to person, place, and time.   Skin: Skin is warm. Capillary refill takes less than 2 seconds. No rash noted.   Psychiatric: Her speech is normal. Thought content normal. Her mood appears anxious. Her affect is angry. She is hyperactive. She exhibits a depressed mood.   Nursing note and vitals reviewed.        Results for orders placed or performed in visit on 01/11/18   Comprehensive Metabolic Panel   Result Value Ref Range    Glucose 113 (H) 65 - 99 mg/dL    BUN 20 8 - 27 mg/dL    Creatinine 1.08 (H) 0.57 - 1.00 mg/dL    eGFR Non African Am 51 (L) >59 mL/min/1.73    eGFR African Am 59 (L) >59 mL/min/1.73    BUN/Creatinine Ratio 19 12 - 28    Sodium 146 (H) 134 - 144 mmol/L    Potassium 4.5 3.5 - 5.2 mmol/L    Chloride 106 96 - 106 mmol/L    Total CO2 23 18 - 29 mmol/L    Calcium 9.4 8.7 - 10.3 mg/dL    Total Protein 6.7 6.0 - 8.5 g/dL    Albumin 4.3 " 3.5 - 4.8 g/dL    Globulin 2.4 1.5 - 4.5 g/dL    A/G Ratio 1.8 1.2 - 2.2    Total Bilirubin 0.5 0.0 - 1.2 mg/dL    Alkaline Phosphatase 87 39 - 117 IU/L    AST (SGOT) 15 0 - 40 IU/L    ALT (SGPT) 10 0 - 32 IU/L   Hemoglobin A1c   Result Value Ref Range    Hemoglobin A1C 5.9 (H) 4.8 - 5.6 %   Lipid Panel With LDL / HDL Ratio   Result Value Ref Range    Total Cholesterol 164 100 - 199 mg/dL    Triglycerides 58 0 - 149 mg/dL    HDL Cholesterol 57 >39 mg/dL    VLDL Cholesterol 12 5 - 40 mg/dL    LDL Cholesterol  95 0 - 99 mg/dL    LDL/HDL Ratio 1.7 0.0 - 3.2 ratio units           Sanjuanita was seen today for follow-up and anxiety.    Diagnoses and all orders for this visit:    Anxiety    Essential hypertension  -     CBC & Differential  -     Comprehensive Metabolic Panel    Smoking addiction    Impaired fasting glucose  -     Comprehensive Metabolic Panel  -     Hemoglobin A1c    Familial hypercholesterolemia  -     Lipid Panel With LDL / HDL Ratio    CKD (chronic kidney disease) stage 3, GFR 30-59 ml/min  -     Comprehensive Metabolic Panel  -     Microalbumin / Creatinine Urine Ratio - Urine, Clean Catch  -     Urinalysis With Culture If Indicated - Urine, Clean Catch    Vitamin D deficiency  -     Vitamin D 25 Hydroxy    Secondary polycythemia  -     CBC & Differential    High risk medication use      Her anxiety is stable on Xanax. She feels well on this, but understands my concerns with this long term. She has follow up with Dr. Soriano next month to help with management. She has failed SSRI and SNRI and doesn't want to attempt another medication. She understands that her smoking is complicating her BP. Spent 50% of 25 minutes in counseling regarding her Xanax and use of high risks medication.     Will check labs in 3 months and follow up afterwards. She has secondary polycythemia that we need to monitor closely. Discussed quitting smoking as I think that the hypoxia is contributing.     Return in about 3 months  (around 10/1/2018) for Recheck.    Hedy Gonzalez MD  07/12/2018

## 2018-08-07 RX ORDER — ALPRAZOLAM 0.5 MG/1
0.5 TABLET ORAL 2 TIMES DAILY
Qty: 60 TABLET | Refills: 0 | Status: SHIPPED | OUTPATIENT
Start: 2018-08-07 | End: 2018-09-07 | Stop reason: SDUPTHER

## 2018-09-11 RX ORDER — ALPRAZOLAM 0.5 MG/1
0.5 TABLET ORAL 2 TIMES DAILY PRN
Qty: 60 TABLET | Refills: 0 | Status: SHIPPED | OUTPATIENT
Start: 2018-09-11 | End: 2018-10-10 | Stop reason: SDUPTHER

## 2018-10-11 RX ORDER — ALPRAZOLAM 0.5 MG/1
0.5 TABLET ORAL 2 TIMES DAILY PRN
Qty: 60 TABLET | Refills: 0 | Status: SHIPPED | OUTPATIENT
Start: 2018-10-11 | End: 2018-10-26 | Stop reason: SDUPTHER

## 2018-10-21 LAB
25(OH)D3+25(OH)D2 SERPL-MCNC: 40.8 NG/ML (ref 30–100)
ALBUMIN SERPL-MCNC: 4.6 G/DL (ref 3.5–4.8)
ALBUMIN/CREAT UR: 6.6 MG/G CREAT (ref 0–30)
ALBUMIN/GLOB SERPL: 1.8 {RATIO} (ref 1.2–2.2)
ALP SERPL-CCNC: 88 IU/L (ref 39–117)
ALT SERPL-CCNC: 15 IU/L (ref 0–32)
APPEARANCE UR: CLEAR
AST SERPL-CCNC: 19 IU/L (ref 0–40)
BACTERIA #/AREA URNS HPF: NORMAL /[HPF]
BACTERIA UR CULT: NORMAL
BACTERIA UR CULT: NORMAL
BASOPHILS # BLD AUTO: 0.1 X10E3/UL (ref 0–0.2)
BASOPHILS NFR BLD AUTO: 1 %
BILIRUB SERPL-MCNC: 0.5 MG/DL (ref 0–1.2)
BILIRUB UR QL STRIP: NEGATIVE
BUN SERPL-MCNC: 20 MG/DL (ref 8–27)
BUN/CREAT SERPL: 18 (ref 12–28)
CALCIUM SERPL-MCNC: 9.9 MG/DL (ref 8.7–10.3)
CHLORIDE SERPL-SCNC: 99 MMOL/L (ref 96–106)
CHOLEST SERPL-MCNC: 183 MG/DL (ref 100–199)
CO2 SERPL-SCNC: 26 MMOL/L (ref 20–29)
COLOR UR: YELLOW
CREAT SERPL-MCNC: 1.12 MG/DL (ref 0.57–1)
CREAT UR-MCNC: 136.5 MG/DL
EOSINOPHIL # BLD AUTO: 0.1 X10E3/UL (ref 0–0.4)
EOSINOPHIL NFR BLD AUTO: 2 %
EPI CELLS #/AREA URNS HPF: NORMAL /HPF
ERYTHROCYTE [DISTWIDTH] IN BLOOD BY AUTOMATED COUNT: 13.6 % (ref 12.3–15.4)
GLOBULIN SER CALC-MCNC: 2.5 G/DL (ref 1.5–4.5)
GLUCOSE SERPL-MCNC: 71 MG/DL (ref 65–99)
GLUCOSE UR QL: NEGATIVE
HBA1C MFR BLD: 6.2 % (ref 4.8–5.6)
HCT VFR BLD AUTO: 47.3 % (ref 34–46.6)
HDLC SERPL-MCNC: 57 MG/DL
HGB BLD-MCNC: 16.3 G/DL (ref 11.1–15.9)
HGB UR QL STRIP: ABNORMAL
IMM GRANULOCYTES # BLD: 0 X10E3/UL (ref 0–0.1)
IMM GRANULOCYTES NFR BLD: 0 %
KETONES UR QL STRIP: NEGATIVE
LDLC SERPL CALC-MCNC: 105 MG/DL (ref 0–99)
LDLC/HDLC SERPL: 1.8 RATIO (ref 0–3.2)
LEUKOCYTE ESTERASE UR QL STRIP: ABNORMAL
LYMPHOCYTES # BLD AUTO: 1.6 X10E3/UL (ref 0.7–3.1)
LYMPHOCYTES NFR BLD AUTO: 18 %
MCH RBC QN AUTO: 33.4 PG (ref 26.6–33)
MCHC RBC AUTO-ENTMCNC: 34.5 G/DL (ref 31.5–35.7)
MCV RBC AUTO: 97 FL (ref 79–97)
MICRO URNS: ABNORMAL
MICROALBUMIN UR-MCNC: 9 UG/ML
MONOCYTES # BLD AUTO: 0.7 X10E3/UL (ref 0.1–0.9)
MONOCYTES NFR BLD AUTO: 8 %
MUCOUS THREADS URNS QL MICRO: PRESENT
NEUTROPHILS # BLD AUTO: 6.2 X10E3/UL (ref 1.4–7)
NEUTROPHILS NFR BLD AUTO: 71 %
NITRITE UR QL STRIP: NEGATIVE
PH UR STRIP: 5 [PH] (ref 5–7.5)
PLATELET # BLD AUTO: 184 X10E3/UL (ref 150–379)
POTASSIUM SERPL-SCNC: 5 MMOL/L (ref 3.5–5.2)
PROT SERPL-MCNC: 7.1 G/DL (ref 6–8.5)
PROT UR QL STRIP: NEGATIVE
RBC # BLD AUTO: 4.88 X10E6/UL (ref 3.77–5.28)
RBC #/AREA URNS HPF: NORMAL /HPF
SODIUM SERPL-SCNC: 140 MMOL/L (ref 134–144)
SP GR UR: 1.02 (ref 1–1.03)
TRIGL SERPL-MCNC: 104 MG/DL (ref 0–149)
URINALYSIS REFLEX: ABNORMAL
UROBILINOGEN UR STRIP-MCNC: 0.2 MG/DL (ref 0.2–1)
VLDLC SERPL CALC-MCNC: 21 MG/DL (ref 5–40)
WBC # BLD AUTO: 8.7 X10E3/UL (ref 3.4–10.8)
WBC #/AREA URNS HPF: NORMAL /HPF

## 2018-10-26 ENCOUNTER — OFFICE VISIT (OUTPATIENT)
Dept: INTERNAL MEDICINE | Facility: CLINIC | Age: 73
End: 2018-10-26

## 2018-10-26 VITALS
WEIGHT: 130 LBS | DIASTOLIC BLOOD PRESSURE: 78 MMHG | RESPIRATION RATE: 16 BRPM | OXYGEN SATURATION: 98 % | HEART RATE: 57 BPM | HEIGHT: 65 IN | BODY MASS INDEX: 21.66 KG/M2 | TEMPERATURE: 97.9 F | SYSTOLIC BLOOD PRESSURE: 124 MMHG

## 2018-10-26 DIAGNOSIS — F41.9 SEVERE ANXIETY: ICD-10-CM

## 2018-10-26 DIAGNOSIS — R73.01 IMPAIRED FASTING GLUCOSE: ICD-10-CM

## 2018-10-26 DIAGNOSIS — Z79.899 HIGH RISK MEDICATION USE: ICD-10-CM

## 2018-10-26 DIAGNOSIS — F32.2 SEVERE DEPRESSION (HCC): ICD-10-CM

## 2018-10-26 DIAGNOSIS — I10 ESSENTIAL HYPERTENSION: ICD-10-CM

## 2018-10-26 DIAGNOSIS — E78.01 FAMILIAL HYPERCHOLESTEROLEMIA: Primary | ICD-10-CM

## 2018-10-26 DIAGNOSIS — N18.30 CKD (CHRONIC KIDNEY DISEASE) STAGE 3, GFR 30-59 ML/MIN (HCC): ICD-10-CM

## 2018-10-26 DIAGNOSIS — D75.1 SECONDARY POLYCYTHEMIA: ICD-10-CM

## 2018-10-26 PROCEDURE — 90662 IIV NO PRSV INCREASED AG IM: CPT | Performed by: INTERNAL MEDICINE

## 2018-10-26 PROCEDURE — G0008 ADMIN INFLUENZA VIRUS VAC: HCPCS | Performed by: INTERNAL MEDICINE

## 2018-10-26 PROCEDURE — 99214 OFFICE O/P EST MOD 30 MIN: CPT | Performed by: INTERNAL MEDICINE

## 2018-10-26 RX ORDER — ALPRAZOLAM 0.5 MG/1
0.5 TABLET ORAL 2 TIMES DAILY PRN
Qty: 60 TABLET | Refills: 0 | Status: SHIPPED | OUTPATIENT
Start: 2018-10-26 | End: 2019-02-07 | Stop reason: SDUPTHER

## 2018-10-26 NOTE — PROGRESS NOTES
"      Sanjuanita Bowen is a 73 y.o. female, who presents with a chief complaint of   Chief Complaint   Patient presents with   • Follow-up     6 month f/u, lab results    • Anxiety       74 yo F here for follow up and doing fair. Her anxiety is worsening and she has not done well on any SSRI's or SNRI's. She saw Dr. Soriano once, but cancelled 2nd appointment with him. She has periods where she feels \"scared\" mostly with the thought of going out. She goes out with  couples and that has made it worse since losing her . She is taking 0.25-0.5mg PO once to twice per day. It has helped some. Has not really wanted to do more therapy until now.     She has chronic HLD that is under good control with Zetia. She feels well on this with no CP or SOB. No dizziness.     She has chronic HTN and is doing well on Zebeta. She tolerates well. She is still smoking, however, and doesn't plan on quitting. Her Hgb is stable and she is on ASA 81mg. No CP or SOB or stroke like symptoms.          The following portions of the patient's history were reviewed and updated as appropriate: allergies, current medications, past family history, past medical history, past social history, past surgical history and problem list.    Allergies: Atorvastatin    Current Outpatient Prescriptions:   •  ALPRAZolam (XANAX) 0.5 MG tablet, Take 1 tablet by mouth 2 (Two) Times a Day As Needed for Anxiety., Disp: 60 tablet, Rfl: 0  •  aspirin 81 MG EC tablet, Take 81 mg by mouth daily., Disp: , Rfl:   •  bisoprolol (ZEBeta) 5 MG tablet, Take 1 tablet by mouth Daily., Disp: 90 tablet, Rfl: 2  •  Cholecalciferol (VITAMIN D PO), Take 1 tablet by mouth daily., Disp: , Rfl:   •  Cyanocobalamin (VITAMIN B12 PO), Take  by mouth., Disp: , Rfl:   •  ezetimibe (ZETIA) 10 MG tablet, Take 1 tablet by mouth Daily., Disp: 90 tablet, Rfl: 3  •  Omega-3 Fatty Acids (FISH OIL) 1000 MG capsule capsule, Take 1 capsule by mouth., Disp: , Rfl:   •  vitamin E 400 UNIT " "capsule, Take 400 Units by mouth Daily., Disp: , Rfl:   Medications Discontinued During This Encounter   Medication Reason   • ALPRAZolam (XANAX) 0.5 MG tablet Reorder       Review of Systems   Constitutional: Negative for chills, fatigue and fever.   HENT: Negative for congestion.    Respiratory: Negative for cough and shortness of breath.    Cardiovascular: Negative for chest pain and palpitations.   Gastrointestinal: Negative for diarrhea, nausea and vomiting.   Genitourinary: Negative for difficulty urinating and dysuria.   Skin: Negative for rash.   Neurological: Negative for dizziness and headaches.   Psychiatric/Behavioral: Positive for decreased concentration and dysphoric mood. Negative for sleep disturbance and suicidal ideas. The patient is nervous/anxious.              /78 (BP Location: Left arm, Patient Position: Sitting, Cuff Size: Small Adult)   Pulse 57   Temp 97.9 °F (36.6 °C) (Oral)   Resp 16   Ht 165.1 cm (65\")   Wt 59 kg (130 lb)   SpO2 98%   BMI 21.63 kg/m²       Physical Exam   Constitutional: She is oriented to person, place, and time. She appears well-developed and well-nourished. No distress.   Smells of cigarette smoke    HENT:   Head: Normocephalic and atraumatic.   Right Ear: Hearing, tympanic membrane, external ear and ear canal normal.   Left Ear: Hearing, tympanic membrane, external ear and ear canal normal.   Nose: Nose normal.   Mouth/Throat: Uvula is midline and mucous membranes are normal. Posterior oropharyngeal erythema present. No oropharyngeal exudate or posterior oropharyngeal edema. Tonsils are 0 on the right. Tonsils are 0 on the left. No tonsillar exudate.   Eyes: Conjunctivae are normal. Right eye exhibits no discharge. Left eye exhibits no discharge. No scleral icterus.   Neck: Neck supple.   Cardiovascular: Normal rate, regular rhythm and normal heart sounds.  Exam reveals no gallop and no friction rub.    No murmur heard.  Pulmonary/Chest: Effort normal and " breath sounds normal. No respiratory distress. She has no wheezes. She has no rales.   Lymphadenopathy:     She has no cervical adenopathy.   Neurological: She is alert and oriented to person, place, and time.   Skin: Skin is warm. No rash noted.   Psychiatric: She has a normal mood and affect. Her behavior is normal.   Nursing note and vitals reviewed.        Results for orders placed or performed in visit on 07/12/18   Urine culture, Comprehensive   Result Value Ref Range    Urine Culture Final report     Result 1 Comment    Comprehensive Metabolic Panel   Result Value Ref Range    Glucose 71 65 - 99 mg/dL    BUN 20 8 - 27 mg/dL    Creatinine 1.12 (H) 0.57 - 1.00 mg/dL    eGFR Non African Am 49 (L) >59 mL/min/1.73    eGFR African Am 56 (L) >59 mL/min/1.73    BUN/Creatinine Ratio 18 12 - 28    Sodium 140 134 - 144 mmol/L    Potassium 5.0 3.5 - 5.2 mmol/L    Chloride 99 96 - 106 mmol/L    Total CO2 26 20 - 29 mmol/L    Calcium 9.9 8.7 - 10.3 mg/dL    Total Protein 7.1 6.0 - 8.5 g/dL    Albumin 4.6 3.5 - 4.8 g/dL    Globulin 2.5 1.5 - 4.5 g/dL    A/G Ratio 1.8 1.2 - 2.2    Total Bilirubin 0.5 0.0 - 1.2 mg/dL    Alkaline Phosphatase 88 39 - 117 IU/L    AST (SGOT) 19 0 - 40 IU/L    ALT (SGPT) 15 0 - 32 IU/L   Hemoglobin A1c   Result Value Ref Range    Hemoglobin A1C 6.2 (H) 4.8 - 5.6 %   Lipid Panel With LDL / HDL Ratio   Result Value Ref Range    Total Cholesterol 183 100 - 199 mg/dL    Triglycerides 104 0 - 149 mg/dL    HDL Cholesterol 57 >39 mg/dL    VLDL Cholesterol 21 5 - 40 mg/dL    LDL Cholesterol  105 (H) 0 - 99 mg/dL    LDL/HDL Ratio 1.8 0.0 - 3.2 ratio   Microalbumin / Creatinine Urine Ratio - Urine, Clean Catch   Result Value Ref Range    Creatinine, Urine 136.5 Not Estab. mg/dL    Microalbumin, Urine 9.0 Not Estab. ug/mL    Microalbumin/Creatinine Ratio 6.6 0.0 - 30.0 mg/g creat   Urinalysis With Culture If Indicated - Urine, Clean Catch   Result Value Ref Range    Specific Gravity, UA 1.021 1.005 - 1.030     pH, UA 5.0 5.0 - 7.5    Color, UA Yellow Yellow    Appearance, UA Clear Clear    Leukocytes, UA 1+ (A) Negative    Protein Negative Negative/Trace    Glucose, UA Negative Negative    Ketones Negative Negative    Blood, UA 1+ (A) Negative    Bilirubin, UA Negative Negative    Urobilinogen, UA 0.2 0.2 - 1.0 mg/dL    Nitrite, UA Negative Negative    Microscopic Examination See below:     Urinalysis Reflex Comment    Vitamin D 25 Hydroxy   Result Value Ref Range    25 Hydroxy, Vitamin D 40.8 30.0 - 100.0 ng/mL   Microscopic Examination   Result Value Ref Range    WBC, UA 0-5 0 - 5 /hpf    RBC, UA 0-2 0 - 2 /hpf    Epithelial Cells (non renal) 0-10 0 - 10 /hpf    Mucus, UA Present Not Estab.    Bacteria, UA Few None seen/Few   CBC & Differential   Result Value Ref Range    WBC 8.7 3.4 - 10.8 x10E3/uL    RBC 4.88 3.77 - 5.28 x10E6/uL    Hemoglobin 16.3 (H) 11.1 - 15.9 g/dL    Hematocrit 47.3 (H) 34.0 - 46.6 %    MCV 97 79 - 97 fL    MCH 33.4 (H) 26.6 - 33.0 pg    MCHC 34.5 31.5 - 35.7 g/dL    RDW 13.6 12.3 - 15.4 %    Platelets 184 150 - 379 x10E3/uL    Neutrophil Rel % 71 Not Estab. %    Lymphocyte Rel % 18 Not Estab. %    Monocyte Rel % 8 Not Estab. %    Eosinophil Rel % 2 Not Estab. %    Basophil Rel % 1 Not Estab. %    Neutrophils Absolute 6.2 1.4 - 7.0 x10E3/uL    Lymphocytes Absolute 1.6 0.7 - 3.1 x10E3/uL    Monocytes Absolute 0.7 0.1 - 0.9 x10E3/uL    Eosinophils Absolute 0.1 0.0 - 0.4 x10E3/uL    Basophils Absolute 0.1 0.0 - 0.2 x10E3/uL    Immature Granulocyte Rel % 0 Not Estab. %    Immature Grans Absolute 0.0 0.0 - 0.1 x10E3/uL           Sanjuanita was seen today for follow-up and anxiety.    Diagnoses and all orders for this visit:    Familial hypercholesterolemia    Essential hypertension    Impaired fasting glucose    CKD (chronic kidney disease) stage 3, GFR 30-59 ml/min (CMS/[facility])    Severe anxiety    Severe depression (CMS/HCC)    High risk medication use    Secondary polycythemia    Other orders  -      Cancel: Tdap Vaccine Greater Than or Equal To 8yo IM  -     Fluzone High Dose =>65Years  -     ALPRAZolam (XANAX) 0.5 MG tablet; Take 1 tablet by mouth 2 (Two) Times a Day As Needed for Anxiety.        HTN is under good control and patient will continue to monitor with home BP cuff. No side effects from medications. Will continue current regimen of BB. Will follow up in 6 months      Patient's cholesterol is under good control. Will continue current regimen of Zetia. No side effects from medications reported. Will follow up in 6 months to monitor levels.     CKD is stable. She is avoiding NSAID's.     She has secondary polycythemia from her smoking. We have not worked this up since it has been chronic and stable. I have encouraged her to stop smoking on multiple occasions and is not ready. Understands risks of stroke. She is on ASA 81mg.     She has severe anxiety and has not done well on any of the SSRI's or SNRI's that I have tried. Did not tolerated Wellbutrin. We have not tried Buspar yet. Tolerates Xanax and is willing to see someone for therapy. Gave her information today on places she can try that might be affordable. Needs UDS the next time that I see her.     Return in about 4 months (around 3/1/2019) for Medicare Wellness, Recheck.    Hedy Gonzalez MD  10/26/2018

## 2018-11-05 RX ORDER — ALPRAZOLAM 0.5 MG/1
0.5 TABLET ORAL 2 TIMES DAILY PRN
Qty: 60 TABLET | Refills: 2 | Status: SHIPPED | OUTPATIENT
Start: 2018-11-05 | End: 2019-02-07 | Stop reason: SDUPTHER

## 2018-11-09 ENCOUNTER — TELEPHONE (OUTPATIENT)
Dept: INTERNAL MEDICINE | Facility: CLINIC | Age: 73
End: 2018-11-09

## 2018-11-09 NOTE — TELEPHONE ENCOUNTER
"I spoke with patient today.  She assures me that she has never taken more than 2 pills per day, but she recalls that she had a party at her house since the last refill and now thinks that someone may have taken some from her.  I advised her to always have this medication put away in a secure place, as it does have a \"street value\", and sometimes people will steal meds to sell.  I also advised her that she should probably stand at the pharmacy counter and count her pills before she leaves to make sure they did not miscount them last time she picked them up.  Patient voiced understanding of all.  ----- Message from Soo Martinez MA sent at 11/5/2018  5:59 PM EST -----  Regarding: FW: Non-Urgent Medical Question  Contact: [phone]      ----- Message -----  From: Hedy Gonzalez MD  Sent: 11/5/2018  12:35 PM  To: Soo Martinez MA  Subject: FW: Non-Urgent Medical Question                  Okay, can you explain to her that her medication is for 0.5mg PO BID PRN. She can't take more than 2 tablets per day or she will run out and this is violation of her contract.  If she is taking 0.5-1.5 tablets per day as she states, then she should not be out. Please make sure that she has #60 each month and that it's for 0.5mg dose and not the 0.25mg. Thanks Pat for checking on this for me. I know this has been an issue for her. I think we just need to tell her the facts of what we are sending and how she should take and when the refill is due.     ----- Message -----  From: Soo Martinez MA  Sent: 11/5/2018  12:13 PM  To: Hedy Gonzalez MD  Subject: FW: Non-Urgent Medical Question                  PER  JOSEFINA I DID LAST FILLED ON   10/11/18.  PT IS SUPPOSE TO BE TAKING   0.5MG I BID.  I  HAVE CONCERNS THAT SHE TAKES THIS AS SHE WANTS  TO INSTEAD OF  HOW IT WAS PRESCRIBED.  SO THAT IS PROBABLY   WHY SHE IS OUT EARLY.  THEY WILL NOT  FILL HER  SCRIPT   UNTIL IT IS DUE  THEY MAY  FILL ON WED OR Thursday OF THIS  WEEK   " ADVISE  ----- Message -----  From: Hedy Gonzalez MD  Sent: 11/5/2018  12:04 PM  To: Soo Martinez MA  Subject: FW: Non-Urgent Medical Question                  We just sent this, right? Is she filling this early or did she just forget to pick this up? I just wanted to check. Thanks Pat.       ----- Message -----  From: Sanjuanita Bowen  Sent: 11/5/2018  11:44 AM  To: Hedy Gonzalez MD  Subject: RE: Non-Urgent Medical Question                  Hedy, I don't know why,  out of Zanax!  I've only been taking 1/2 pill or 1 1/2 a day off and on and I'm out!  According to date on refill it say 11/11/18.  No way have I taken 2 pills a day to be out on 11/5!  It's crazy - don't know how I can get a refill, need to get them as suffering severe anxiety and nerves this weekend.    ----- Message -----  From: Hedy Gonzalez MD  Sent: 10/29/2018  2:55 PM EDT  To: Sanjuanita Bowen  Subject: RE: Non-Urgent Medical Question  Sounds great. Please just have her send me her report.       ----- Message -----     From: Sanjuanita Bowen     Sent: 10/29/2018 11:28 AM EDT       To: Hedy Gonzalez MD  Subject: RE: Non-Urgent Medical Question    DAKOTA Knott, made an appt with Devi Go with [facility] - pray she can help me overcome this crazy anxiety and nervousness - appreciate  you giving me their names, etc., they do take medicare and insurance so that's really good!  If  you want me to, I'll let you how it goes; if not, see you  after the little one comes and will fill you in  ----- Message -----  From: Hedy Gonzalez MD  Sent: 10/29/2018  5:37 AM EDT  To: Sanjuanita Bowen  Subject: RE: Non-Urgent Medical Question  Mrs. Bowen,     I believe we have talked about these diagnoses before, but usually your anxiety, cholesterol and smoking tend to take center stage during your appointments. You have had these diagnoses since I started taking care of you when you were with Dr. Ruffin and they are stable.       Your CKD or chronic  "kidney disease is mild and can actually just be related to age, however, it is compounded due to your long term history of your smoking and now blood pressure issues. The best way to keep your kidneys healthy is to drink plenty of water, avoid drugs that contain NDAID’s and continue to work on quitting smoking and keeping BP well controlled. Does that help?     You also have always had an elevated hemoglobin. This is called secondary polycythemia. This is also related to smoking unfortunately. Smoking results in you inhaling carbon monoxide instead of oxygen and damages your lungs. This results in you not getting enough oxygen and your blood counts go up to compensate for the lack of oxygen. It has been stable since 2015 that I can see back, but could go further back in our other system, but am sure that it’s the same. The high hemoglobin makes your blood slightly thicker and does put you at higher risks for a heart attack and stroke. This is why you are on a baby aspirin and should remain on this. If the blood counts continue to rise sometimes the blood doctors will actually take blood away from you to lower this. In the end, quitting smoking would normalize this.     Even though you don’t feel like it, you were much clearer during your appointment with me Friday from a mental standpoint and maybe that’s why you just noticed these things. I am not sure, but none the less, they are stable and we will continue to follow these at at least every 6 months or more often if we need to.     Dr. Gonzalez     ----- Message -----     From: Sanjuanita Bowen     Sent: 10/26/2018  2:21 PM EDT       To: Hedy Gonzalez MD  Subject: Non-Urgent Medical Question    Dr. Gonzalez, on my \" after visit summary\" I noticed that I was diagnosed with CKD (Chronic Kidney Disease Stage 3, GFR 30-59 ml/min); have no idea what this means - we didn't discuss this and it scared me when I saw this!  Can you explain what that means?  Also, an elevated red " blood cell count??   Thanks, Sanjuanita

## 2019-02-07 RX ORDER — ALPRAZOLAM 0.5 MG/1
0.5 TABLET ORAL 2 TIMES DAILY PRN
Qty: 60 TABLET | Refills: 2 | Status: SHIPPED | OUTPATIENT
Start: 2019-02-07 | End: 2019-03-07 | Stop reason: SDUPTHER

## 2019-02-07 RX ORDER — ALPRAZOLAM 0.5 MG/1
0.5 TABLET ORAL 2 TIMES DAILY PRN
Qty: 60 TABLET | Refills: 0 | Status: SHIPPED | OUTPATIENT
Start: 2019-02-07 | End: 2019-03-07

## 2019-02-07 NOTE — TELEPHONE ENCOUNTER
Patient advised via MyChart    ----- Message from Hdey Gonzalez MD sent at 2/6/2019  7:43 AM EST -----  Regarding: RE: FYI - mychart message  Please fill two times per day as needed at 0.5mg. I do not want her to take three times per day because it’s too much. She needs to understand that if she takes it three times per day, she is going to run out early and we will not refill it for her before it is due. I know she knows this, but continues to take differently than prescribed.  ----- Message -----  From: Monica Peraza CMA  Sent: 2/5/2019   3:23 PM  To: Hedy Gonzalez MD  Subject: FYI - mychart message                            Good afternoon Mrs. Bowen,    Per Dr. Gonzalez's last message she does not want you to take anymore tablets then what is prescribed. From my understanding your max dose is 2 tablets daily as needed. Dr. Gonzalez will return on Friday and I can double check with her. If you have anymore questions please feel free to call the office.     Have a good evening,     -Monica VILLANUEVA CMA       ===View-only below this line===      ----- Message -----     From: Sanjuanita Bowen     Sent: 2/5/2019  2:20 PM EST       To: Hedy Gonzalez MD  Subject: Prescription Question    It is time to refill my prescription for Alprazolam 0.5mg, 1 table or 2 a day - have been having to add 0.5mg.3xs a day on some days.  Any way I can have prescription increased to 3 xs a day as needed at 0.5 mg?  It seems to help me more when needed - thanks for the inquiry.  Dr. Carlos Cage's patient.  Prescription is due to be refilled on 2/8/19.  Thank you

## 2019-02-22 RX ORDER — BISOPROLOL FUMARATE 5 MG/1
5 TABLET, FILM COATED ORAL DAILY
Qty: 90 TABLET | Refills: 2 | Status: SHIPPED | OUTPATIENT
Start: 2019-02-22 | End: 2019-02-27 | Stop reason: SDUPTHER

## 2019-02-27 RX ORDER — BISOPROLOL FUMARATE 5 MG/1
5 TABLET, FILM COATED ORAL DAILY
Qty: 90 TABLET | Refills: 0 | Status: SHIPPED | OUTPATIENT
Start: 2019-02-27 | End: 2019-08-30 | Stop reason: SDUPTHER

## 2019-03-07 ENCOUNTER — OFFICE VISIT (OUTPATIENT)
Dept: INTERNAL MEDICINE | Facility: CLINIC | Age: 74
End: 2019-03-07

## 2019-03-07 VITALS
TEMPERATURE: 97.7 F | BODY MASS INDEX: 23.04 KG/M2 | WEIGHT: 130 LBS | DIASTOLIC BLOOD PRESSURE: 78 MMHG | OXYGEN SATURATION: 99 % | RESPIRATION RATE: 14 BRPM | SYSTOLIC BLOOD PRESSURE: 140 MMHG | HEART RATE: 57 BPM | HEIGHT: 63 IN

## 2019-03-07 DIAGNOSIS — D75.1 SECONDARY POLYCYTHEMIA: ICD-10-CM

## 2019-03-07 DIAGNOSIS — I10 ESSENTIAL HYPERTENSION: ICD-10-CM

## 2019-03-07 DIAGNOSIS — Z13.29 SCREENING FOR HYPOTHYROIDISM: ICD-10-CM

## 2019-03-07 DIAGNOSIS — R73.01 IMPAIRED FASTING GLUCOSE: ICD-10-CM

## 2019-03-07 DIAGNOSIS — N18.30 CKD (CHRONIC KIDNEY DISEASE) STAGE 3, GFR 30-59 ML/MIN (HCC): ICD-10-CM

## 2019-03-07 DIAGNOSIS — F41.9 SEVERE ANXIETY: ICD-10-CM

## 2019-03-07 DIAGNOSIS — F32.2 SEVERE DEPRESSION (HCC): ICD-10-CM

## 2019-03-07 DIAGNOSIS — E78.01 FAMILIAL HYPERCHOLESTEROLEMIA: ICD-10-CM

## 2019-03-07 DIAGNOSIS — E55.9 VITAMIN D DEFICIENCY: ICD-10-CM

## 2019-03-07 DIAGNOSIS — Z00.00 MEDICARE ANNUAL WELLNESS VISIT, SUBSEQUENT: Primary | ICD-10-CM

## 2019-03-07 PROCEDURE — G0439 PPPS, SUBSEQ VISIT: HCPCS | Performed by: INTERNAL MEDICINE

## 2019-03-07 PROCEDURE — 99214 OFFICE O/P EST MOD 30 MIN: CPT | Performed by: INTERNAL MEDICINE

## 2019-03-07 RX ORDER — CLONAZEPAM 0.5 MG/1
0.5 TABLET ORAL 2 TIMES DAILY
COMMUNITY
Start: 2019-03-05 | End: 2019-07-26

## 2019-03-07 NOTE — PATIENT INSTRUCTIONS
Medicare Wellness  Personal Prevention Plan of Service     Date of Office Visit:  2019  Encounter Provider:  Hedy Gonzalez MD  Place of Service:  [facility]  Patient Name: Sanjuanita Bowen  :  1945    As part of the Medicare Wellness portion of your visit today, we are providing you with this personalized preventive plan of services (PPPS). This plan is based upon recommendations of the [place] Preventive Services Task Force (USPSTF) and the Advisory Committee on Immunization Practices (ACIP).    This lists the preventive care services that should be considered, and provides dates of when you are due. Items listed as completed are up-to-date and do not require any further intervention.    Health Maintenance   Topic Date Due   • ZOSTER VACCINE (2 of 3) 2008   • TDAP/TD VACCINES (2 - Td) 2018   • LIPID PANEL  10/18/2019   • MEDICARE ANNUAL WELLNESS  2020   • COLONOSCOPY  2020   • MAMMOGRAM  2021   • DXA SCAN  2021   • INFLUENZA VACCINE  Completed   • PNEUMOCOCCAL VACCINES (65+ LOW/MEDIUM RISK)  Completed       Orders Placed This Encounter   Procedures   • Comprehensive Metabolic Panel   • Lipid Panel With LDL / HDL Ratio   • Hemoglobin A1c   • Urinalysis With Culture If Indicated - Urine, Clean Catch   • Vitamin D 25 Hydroxy   • TSH Rfx On Abnormal To Free T4   • CBC & Differential     Order Specific Question:   Manual Differential     Answer:   No       Return in about 6 months (around 2019) for Recheck.

## 2019-03-07 NOTE — PROGRESS NOTES
QUICK REFERENCE INFORMATION:  The ABCs of the Annual Wellness Visit    Subsequent Medicare Wellness Visit    HEALTH RISK ASSESSMENT    1945    Recent Hospitalizations:  No hospitalization(s) within the last year..        Current Medical Providers:  Patient Care Team:  Hedy Gonzalez MD as PCP - General (Internal Medicine)  Hedy Gonzalez MD as PCP - Claims Attributed  Karey Menjivar MD as Consulting Physician (Obstetrics and Gynecology)  Segundo Aly MD (Psychiatry)        Smoking Status:  Social History     Tobacco Use   Smoking Status Current Every Day Smoker   • Packs/day: 0.50   • Types: Cigarettes   Smokeless Tobacco Never Used       Alcohol Consumption:  Social History     Substance and Sexual Activity   Alcohol Use Yes   • Alcohol/week: 1.2 oz   • Types: 2 Glasses of wine per week       Depression Screen:   PHQ-2/PHQ-9 Depression Screening 3/7/2019   Little interest or pleasure in doing things 1   Feeling down, depressed, or hopeless 3   Trouble falling or staying asleep, or sleeping too much 0   Feeling tired or having little energy 1   Poor appetite or overeating 0   Feeling bad about yourself - or that you are a failure or have let yourself or your family down 3   Trouble concentrating on things, such as reading the newspaper or watching television 0   Moving or speaking so slowly that other people could have noticed. Or the opposite - being so fidgety or restless that you have been moving around a lot more than usual 0   Thoughts that you would be better off dead, or of hurting yourself in some way 0   Total Score 8   If you checked off any problems, how difficult have these problems made it for you to do your work, take care of things at home, or get along with other people? Not difficult at all       Health Habits and Functional and Cognitive Screening:  Functional & Cognitive Status 3/7/2019   Do you have difficulty preparing food and eating? Yes   Do you have difficulty bathing  yourself, getting dressed or grooming yourself? No   Do you have difficulty using the toilet? No   Do you have difficulty moving around from place to place? No   Do you have trouble with steps or getting out of a bed or a chair? No   In the past year have you fallen or experienced a near fall? No   Current Diet Well Balanced Diet   Dental Exam Up to date   Eye Exam Up to date   Exercise (times per week) 2 times per week   Current Exercise Activities Include Dancing   Do you need help using the phone?  No   Are you deaf or do you have serious difficulty hearing?  Yes   Do you need help with transportation? No   Do you need help shopping? No   Do you need help preparing meals?  No   Do you need help with housework?  No   Do you need help with laundry? No   Do you need help taking your medications? No   Do you need help managing money? No   Do you ever drive or ride in a car without wearing a seat belt? No   Have you felt unusual stress, anger or loneliness in the last month? Yes   Who do you live with? Alone   If you need help, do you have trouble finding someone available to you? No   Have you been bothered in the last four weeks by sexual problems? No   Do you have difficulty concentrating, remembering or making decisions? No           Does the patient have evidence of cognitive impairment? No    Aspirin use counseling: Taking ASA appropriately as indicated      Recent Lab Results:  CMP:  Lab Results   Component Value Date    GLU 71 10/18/2018    BUN 20 10/18/2018    CREATININE 1.12 (H) 10/18/2018    EGFRIFNONA 49 (L) 10/18/2018    EGFRIFAFRI 56 (L) 10/18/2018    BCR 18 10/18/2018     10/18/2018    K 5.0 10/18/2018    CO2 26 10/18/2018    CALCIUM 9.9 10/18/2018    PROTENTOTREF 7.1 10/18/2018    ALBUMIN 4.6 10/18/2018    LABGLOBREF 2.5 10/18/2018    LABIL2 1.8 10/18/2018    BILITOT 0.5 10/18/2018    ALKPHOS 88 10/18/2018    AST 19 10/18/2018    ALT 15 10/18/2018     Lipid Panel:  Lab Results   Component Value  Date    TRIG 104 10/18/2018    HDL 57 10/18/2018    VLDL 21 10/18/2018    LDLHDL 1.8 10/18/2018     HbA1c:  Lab Results   Component Value Date    HGBA1C 6.2 (H) 10/18/2018       Visual Acuity:  No exam data present    Age-appropriate Screening Schedule:  Refer to the list below for future screening recommendations based on patient's age, sex and/or medical conditions. Orders for these recommended tests are listed in the plan section. The patient has been provided with a written plan.    Health Maintenance   Topic Date Due   • ZOSTER VACCINE (2 of 3) 03/28/2008   • TDAP/TD VACCINES (2 - Td) 08/01/2018   • LIPID PANEL  10/18/2019   • COLONOSCOPY  09/23/2020   • MAMMOGRAM  03/07/2021   • DXA SCAN  03/07/2021   • INFLUENZA VACCINE  Completed   • PNEUMOCOCCAL VACCINES (65+ LOW/MEDIUM RISK)  Completed        Subjective   History of Present Illness    Sanjuanita Bowen is a 73 y.o. female who presents for an Subsequent Wellness Visit.      She is seeing Dr. Go (psycholgist) as well as Dr. Aly. She does not want to be on Xanax anymore and feels that she is addicted. He put her on 0.25-00.5mg PO BID of Klonopin PRN as well Zoloft 50mg in the morning. She has taken Paxil, Zoloft and SNRI in the past that has not helped.     She has chronic HTN and is doing well on Zebeta. She tolerates well without CP or SOB.     She has chronic HLD and is doing well on Zetia. No CP. Continues to smoke and has been intolerant to statin. Takes ASA 81mg.     She has secondary polycythemia from smoking. No stroke like symptoms. Not interested in quitting at this time.       The following portions of the patient's history were reviewed and updated as appropriate: allergies, current medications, past family history, past medical history, past social history, past surgical history and problem list.    Outpatient Medications Prior to Visit   Medication Sig Dispense Refill   • aspirin 81 MG EC tablet Take 81 mg by mouth daily.     • bisoprolol  (ZEBeta) 5 MG tablet TAKE 1 TABLET BY MOUTH DAILY 90 tablet 0   • Cholecalciferol (VITAMIN D PO) Take 1 tablet by mouth daily.     • clonazePAM (KlonoPIN) 0.5 MG tablet Take 0.5 tablets by mouth 2 (Two) Times a Day.     • Cyanocobalamin (VITAMIN B12 PO) Take  by mouth.     • ezetimibe (ZETIA) 10 MG tablet Take 1 tablet by mouth Daily. 90 tablet 3   • Omega-3 Fatty Acids (FISH OIL) 1000 MG capsule capsule Take 1 capsule by mouth.     • sertraline (ZOLOFT) 50 MG tablet Take 0.5 tablets by mouth Daily.     • vitamin E 400 UNIT capsule Take 400 Units by mouth Daily.     • ALPRAZolam (XANAX) 0.5 MG tablet TAKE 1 TABLET BY MOUTH 2 (TWO) TIMES A DAY AS NEEDED FOR ANXIETY. 60 tablet 2   • ALPRAZolam (XANAX) 0.5 MG tablet Take 1 tablet by mouth 2 (Two) Times a Day As Needed for Anxiety. 60 tablet 0     No facility-administered medications prior to visit.        Patient Active Problem List   Diagnosis   • Hyperlipidemia   • History of repair of hip joint   • Osteoarthritis of hip   • Smoking addiction   • Impaired fasting glucose   • CKD (chronic kidney disease) stage 3, GFR 30-59 ml/min (CMS/HCC)   • Severe depression (CMS/HCC)   • Severe anxiety   • Essential hypertension   • Vitamin D deficiency   • Secondary polycythemia   • High risk medication use       Advance Care Planning:  has an advance directive - a copy HAS NOT been provided. Have asked the patient to send this to us to add to record.    Identification of Risk Factors:  Risk factors include: weight , unhealthy diet, cardiovascular risk, tobacco use and depression.    Review of Systems   Constitutional: Positive for fatigue. Negative for chills and fever.   HENT: Negative for congestion.    Respiratory: Negative for cough and shortness of breath.    Cardiovascular: Negative for chest pain and palpitations.   Gastrointestinal: Negative for abdominal pain, diarrhea, nausea and vomiting.   Genitourinary: Negative for difficulty urinating and dysuria.   Skin: Negative  for rash.   Psychiatric/Behavioral: Positive for decreased concentration. Negative for sleep disturbance and suicidal ideas. The patient is nervous/anxious.        Compared to one year ago, the patient feels her physical health is the same.  Compared to one year ago, the patient feels her mental health is worse.    Objective     Physical Exam   Constitutional: She is oriented to person, place, and time. She appears well-developed and well-nourished. No distress.   HENT:   Head: Normocephalic and atraumatic.   Right Ear: Hearing, tympanic membrane, external ear and ear canal normal.   Left Ear: Hearing, tympanic membrane, external ear and ear canal normal.   Nose: Nose normal.   Mouth/Throat: Uvula is midline and mucous membranes are normal. Posterior oropharyngeal erythema present. No oropharyngeal exudate or posterior oropharyngeal edema. Tonsils are 0 on the right. Tonsils are 0 on the left. No tonsillar exudate.   Eyes: Conjunctivae are normal. Right eye exhibits no discharge. Left eye exhibits no discharge. No scleral icterus.   Neck: Trachea normal and full passive range of motion without pain. Neck supple. Carotid bruit is not present. No thyroid mass and no thyromegaly present.   Cardiovascular: Normal rate, regular rhythm and normal heart sounds. Exam reveals no gallop and no friction rub.   No murmur heard.  Pulmonary/Chest: Effort normal and breath sounds normal. No respiratory distress. She has no wheezes. She has no rales.   Abdominal: Soft. Bowel sounds are normal. She exhibits no distension and no mass. There is no tenderness. There is no guarding.   Lymphadenopathy:     She has no cervical adenopathy.   Neurological: She is alert and oriented to person, place, and time.   Skin: Skin is warm. No rash noted.   Psychiatric: She has a normal mood and affect. Her behavior is normal.   Nursing note and vitals reviewed.      Vitals:    03/07/19 1110   BP: 140/78   BP Location: Left arm   Patient Position:  "Sitting   Cuff Size: Small Adult   Pulse: 57   Resp: 14   Temp: 97.7 °F (36.5 °C)   TempSrc: Oral   SpO2: 99%   Weight: 59 kg (130 lb)   Height: 161.2 cm (63.47\")   PainSc: 0-No pain       Patient's Body mass index is 22.69 kg/m². BMI is within normal parameters. No follow-up required..      Assessment/Plan   Patient Self-Management and Personalized Health Advice  The patient has been provided with information about: diet, exercise, weight management, prevention of cardiac or vascular disease, fall prevention, designing advance directives, tobacco abuse, and mental health concerns and preventive services including:   · Advance directive, Diabetes screening, see lab orders, Exercise counseling provided, Fall Risk assessment done, Nutrition counseling provided.    Visit Diagnoses:    ICD-10-CM ICD-9-CM   1. Medicare annual wellness visit, subsequent Z00.00 V70.0   2. Essential hypertension I10 401.9   3. Familial hypercholesterolemia E78.01 272.0   4. Impaired fasting glucose R73.01 790.21   5. CKD (chronic kidney disease) stage 3, GFR 30-59 ml/min (CMS/[facility]) N18.3 585.3   6. Severe anxiety F41.9 300.00   7. Severe depression (CMS/[facility]) F32.2 311   8. Secondary polycythemia D75.1 289.0   9. Vitamin D deficiency E55.9 268.9   10. Screening for hypothyroidism Z13.29 V77.0     Patient needs to have labs done and I have given her slip to have at local [facility] near her. Will call her with results of these.     Dr. Aly is going to be managing her psych medicines from now on.  She is resistant to trying Zoloft again, but I encouraged her to do this again. She has severe anxiety that is not treated well. I am hopeful that she can wean Klonopin after Zoloft takes affect. Genesight was given to her and will be faxed to Dr. Aly as well for him to review.     HTN is under good control and patient will continue to monitor with home BP cuff. No side effects from medications. Will continue current regimen of Zebeta. Will " follow up in 6 months      Patient's cholesterol is under good control. Will continue current regimen of Zetia. No side effects from medications reported. Will follow up in 6 months to monitor levels.     Dr. Menjivar is managing her mammogram and DEXA scans     Will call her with labs to see if HgA1c is better or worse and plan. Avoid NSAID's with her CKD and push fluids.         Orders Placed This Encounter   Procedures   • Comprehensive Metabolic Panel   • Lipid Panel With LDL / HDL Ratio   • Hemoglobin A1c   • Urinalysis With Culture If Indicated - Urine, Clean Catch   • Vitamin D 25 Hydroxy   • TSH Rfx On Abnormal To Free T4   • CBC & Differential     Order Specific Question:   Manual Differential     Answer:   No       Outpatient Encounter Medications as of 3/7/2019   Medication Sig Dispense Refill   • aspirin 81 MG EC tablet Take 81 mg by mouth daily.     • bisoprolol (ZEBeta) 5 MG tablet TAKE 1 TABLET BY MOUTH DAILY 90 tablet 0   • Cholecalciferol (VITAMIN D PO) Take 1 tablet by mouth daily.     • clonazePAM (KlonoPIN) 0.5 MG tablet Take 0.5 tablets by mouth 2 (Two) Times a Day.     • Cyanocobalamin (VITAMIN B12 PO) Take  by mouth.     • ezetimibe (ZETIA) 10 MG tablet Take 1 tablet by mouth Daily. 90 tablet 3   • Omega-3 Fatty Acids (FISH OIL) 1000 MG capsule capsule Take 1 capsule by mouth.     • sertraline (ZOLOFT) 50 MG tablet Take 0.5 tablets by mouth Daily.     • vitamin E 400 UNIT capsule Take 400 Units by mouth Daily.     • [DISCONTINUED] ALPRAZolam (XANAX) 0.5 MG tablet TAKE 1 TABLET BY MOUTH 2 (TWO) TIMES A DAY AS NEEDED FOR ANXIETY. 60 tablet 2   • [DISCONTINUED] ALPRAZolam (XANAX) 0.5 MG tablet Take 1 tablet by mouth 2 (Two) Times a Day As Needed for Anxiety. 60 tablet 0     No facility-administered encounter medications on file as of 3/7/2019.        Reviewed use of high risk medication in the elderly: yes  Reviewed for potential of harmful drug interactions in the elderly: yes    Follow  Up:  Return in about 6 months (around 9/7/2019) for Recheck.     An After Visit Summary and PPPS with all of these plans were given to the patient.

## 2019-03-17 LAB
25(OH)D3+25(OH)D2 SERPL-MCNC: 40.2 NG/ML (ref 30–100)
ALBUMIN SERPL-MCNC: 4.4 G/DL (ref 3.5–4.8)
ALBUMIN/GLOB SERPL: 1.8 {RATIO} (ref 1.2–2.2)
ALP SERPL-CCNC: 85 IU/L (ref 39–117)
ALT SERPL-CCNC: 10 IU/L (ref 0–32)
APPEARANCE UR: ABNORMAL
AST SERPL-CCNC: 13 IU/L (ref 0–40)
BACTERIA #/AREA URNS HPF: ABNORMAL /[HPF]
BACTERIA UR CULT: NORMAL
BACTERIA UR CULT: NORMAL
BASOPHILS # BLD AUTO: 0.1 X10E3/UL (ref 0–0.2)
BASOPHILS NFR BLD AUTO: 1 %
BILIRUB SERPL-MCNC: 0.8 MG/DL (ref 0–1.2)
BILIRUB UR QL STRIP: NEGATIVE
BUN SERPL-MCNC: 19 MG/DL (ref 8–27)
BUN/CREAT SERPL: 16 (ref 12–28)
CALCIUM SERPL-MCNC: 9.7 MG/DL (ref 8.7–10.3)
CASTS URNS MICRO: ABNORMAL
CASTS URNS QL MICRO: PRESENT /LPF
CHLORIDE SERPL-SCNC: 105 MMOL/L (ref 96–106)
CHOLEST SERPL-MCNC: 174 MG/DL (ref 100–199)
CO2 SERPL-SCNC: 23 MMOL/L (ref 20–29)
COLOR UR: YELLOW
CREAT SERPL-MCNC: 1.22 MG/DL (ref 0.57–1)
EOSINOPHIL # BLD AUTO: 0.2 X10E3/UL (ref 0–0.4)
EOSINOPHIL NFR BLD AUTO: 2 %
EPI CELLS #/AREA URNS HPF: >10 /HPF
ERYTHROCYTE [DISTWIDTH] IN BLOOD BY AUTOMATED COUNT: 13.1 % (ref 12.3–15.4)
GLOBULIN SER CALC-MCNC: 2.4 G/DL (ref 1.5–4.5)
GLUCOSE SERPL-MCNC: 117 MG/DL (ref 65–99)
GLUCOSE UR QL: NEGATIVE
HBA1C MFR BLD: 6.1 % (ref 4.8–5.6)
HCT VFR BLD AUTO: 45.8 % (ref 34–46.6)
HDLC SERPL-MCNC: 58 MG/DL
HGB BLD-MCNC: 15.9 G/DL (ref 11.1–15.9)
HGB UR QL STRIP: ABNORMAL
IMM GRANULOCYTES # BLD AUTO: 0 X10E3/UL (ref 0–0.1)
IMM GRANULOCYTES NFR BLD AUTO: 0 %
KETONES UR QL STRIP: NEGATIVE
LDLC SERPL CALC-MCNC: 96 MG/DL (ref 0–99)
LDLC/HDLC SERPL: 1.7 RATIO (ref 0–3.2)
LEUKOCYTE ESTERASE UR QL STRIP: ABNORMAL
LYMPHOCYTES # BLD AUTO: 1.6 X10E3/UL (ref 0.7–3.1)
LYMPHOCYTES NFR BLD AUTO: 18 %
MCH RBC QN AUTO: 33.5 PG (ref 26.6–33)
MCHC RBC AUTO-ENTMCNC: 34.7 G/DL (ref 31.5–35.7)
MCV RBC AUTO: 97 FL (ref 79–97)
MICRO URNS: ABNORMAL
MONOCYTES # BLD AUTO: 0.6 X10E3/UL (ref 0.1–0.9)
MONOCYTES NFR BLD AUTO: 7 %
MUCOUS THREADS URNS QL MICRO: PRESENT
NEUTROPHILS # BLD AUTO: 6.2 X10E3/UL (ref 1.4–7)
NEUTROPHILS NFR BLD AUTO: 72 %
NITRITE UR QL STRIP: NEGATIVE
PH UR STRIP: 5.5 [PH] (ref 5–7.5)
PLATELET # BLD AUTO: 186 X10E3/UL (ref 150–379)
POTASSIUM SERPL-SCNC: 4.7 MMOL/L (ref 3.5–5.2)
PROT SERPL-MCNC: 6.8 G/DL (ref 6–8.5)
PROT UR QL STRIP: NEGATIVE
RBC # BLD AUTO: 4.74 X10E6/UL (ref 3.77–5.28)
RBC #/AREA URNS HPF: ABNORMAL /HPF
SODIUM SERPL-SCNC: 143 MMOL/L (ref 134–144)
SP GR UR: 1.01 (ref 1–1.03)
TRIGL SERPL-MCNC: 102 MG/DL (ref 0–149)
TSH SERPL DL<=0.005 MIU/L-ACNC: 1.41 UIU/ML (ref 0.45–4.5)
URINALYSIS REFLEX: ABNORMAL
UROBILINOGEN UR STRIP-MCNC: 0.2 MG/DL (ref 0.2–1)
VLDLC SERPL CALC-MCNC: 20 MG/DL (ref 5–40)
WBC # BLD AUTO: 8.6 X10E3/UL (ref 3.4–10.8)
WBC #/AREA URNS HPF: ABNORMAL /HPF

## 2019-03-18 NOTE — PROGRESS NOTES
Labs all look good except kidney function is down a little bit. Needs to push fluids and drink a lot of water. Repeat kidney function in 3-4 weeks, just needs a non-fasting BMP.

## 2019-03-20 ENCOUNTER — TELEPHONE (OUTPATIENT)
Dept: INTERNAL MEDICINE | Facility: CLINIC | Age: 74
End: 2019-03-20

## 2019-03-20 DIAGNOSIS — N28.9 LOW KIDNEY FUNCTION: Primary | ICD-10-CM

## 2019-04-04 RX ORDER — EZETIMIBE 10 MG/1
10 TABLET ORAL DAILY
Qty: 90 TABLET | Refills: 3 | Status: SHIPPED | OUTPATIENT
Start: 2019-04-04

## 2019-04-26 LAB
BUN SERPL-MCNC: 18 MG/DL (ref 8–27)
BUN/CREAT SERPL: 16 (ref 12–28)
CALCIUM SERPL-MCNC: 9.5 MG/DL (ref 8.7–10.3)
CHLORIDE SERPL-SCNC: 106 MMOL/L (ref 96–106)
CO2 SERPL-SCNC: 23 MMOL/L (ref 20–29)
CREAT SERPL-MCNC: 1.13 MG/DL (ref 0.57–1)
GLUCOSE SERPL-MCNC: 117 MG/DL (ref 65–99)
POTASSIUM SERPL-SCNC: 4.8 MMOL/L (ref 3.5–5.2)
SODIUM SERPL-SCNC: 144 MMOL/L (ref 134–144)

## 2019-06-04 ENCOUNTER — APPOINTMENT (OUTPATIENT)
Dept: WOMENS IMAGING | Facility: HOSPITAL | Age: 74
End: 2019-06-04

## 2019-06-04 PROCEDURE — 77067 SCR MAMMO BI INCL CAD: CPT | Performed by: RADIOLOGY

## 2019-06-04 PROCEDURE — 77063 BREAST TOMOSYNTHESIS BI: CPT | Performed by: RADIOLOGY

## 2019-07-26 ENCOUNTER — OFFICE VISIT (OUTPATIENT)
Dept: INTERNAL MEDICINE | Facility: CLINIC | Age: 74
End: 2019-07-26

## 2019-07-26 VITALS
HEART RATE: 62 BPM | BODY MASS INDEX: 23.04 KG/M2 | OXYGEN SATURATION: 95 % | HEIGHT: 63 IN | TEMPERATURE: 98.3 F | WEIGHT: 130 LBS | DIASTOLIC BLOOD PRESSURE: 80 MMHG | SYSTOLIC BLOOD PRESSURE: 138 MMHG | RESPIRATION RATE: 16 BRPM

## 2019-07-26 DIAGNOSIS — F41.9 SEVERE ANXIETY: Primary | ICD-10-CM

## 2019-07-26 DIAGNOSIS — I10 ESSENTIAL HYPERTENSION: ICD-10-CM

## 2019-07-26 PROCEDURE — 99214 OFFICE O/P EST MOD 30 MIN: CPT | Performed by: INTERNAL MEDICINE

## 2019-07-26 RX ORDER — ALPRAZOLAM 0.25 MG/1
0.25 TABLET ORAL 3 TIMES DAILY PRN
Qty: 90 TABLET | Refills: 0 | Status: SHIPPED | OUTPATIENT
Start: 2019-07-26 | End: 2019-08-21 | Stop reason: SDUPTHER

## 2019-07-26 NOTE — PROGRESS NOTES
"      Sanjuanita Bowen is a 74 y.o. female, who presents with a chief complaint of   Chief Complaint   Patient presents with   • Anxiety     Pt stopped seeing Psychiatrist         73 yo F here for follow up of her severe anxiety.  She has tried multiple SSRI's and SNRI's that she hs not tolerated. She is getting nervous all the time and worse with going out and driving. Her  has been dead 2 years now and yesterday was her 55 year anniversary.     She has been having some sweats and \"jittery\" in the AM before she takes her medication. She feels \"dizzy\" sometimes as well. 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 Stumbling and tripping more. She is not functional on any medication besides Xanax per her. Currently taking Klonopin 0.5mg BID PRN prescribed by her psychiatrist.     She went off of baby ASA because she was bruising. She is still smoking. She is taking Zebeta for her HTN and anxiety and doing well on this. Having a hard time getting it filled at [facility].            The following portions of the patient's history were reviewed and updated as appropriate: allergies, current medications, past family history, past medical history, past social history, past surgical history and problem list.    Allergies: Patient has no known allergies.    Current Outpatient Medications:   •  bisoprolol (ZEBeta) 5 MG tablet, TAKE 1 TABLET BY MOUTH DAILY, Disp: 90 tablet, Rfl: 0  •  Cholecalciferol (VITAMIN D PO), Take 1 tablet by mouth daily., Disp: , Rfl:   •  Cyanocobalamin (VITAMIN B12 PO), Take  by mouth., Disp: , Rfl:   •  ezetimibe (ZETIA) 10 MG tablet, TAKE 1 TABLET BY MOUTH DAILY., Disp: 90 tablet, Rfl: 3  •  vitamin E 400 UNIT capsule, Take 400 Units by mouth Daily., Disp: , Rfl:   •  ALPRAZolam (XANAX) 0.25 MG tablet, Take 1 tablet by mouth 3 (Three) Times a Day As Needed for Anxiety., Disp: 90 tablet, Rfl: 0  Medications Discontinued During This Encounter   Medication Reason   • aspirin " "81 MG EC tablet *Therapy completed   • sertraline (ZOLOFT) 50 MG tablet *Therapy completed   • Omega-3 Fatty Acids (FISH OIL) 1000 MG capsule capsule *Therapy completed   • clonazePAM (KlonoPIN) 0.5 MG tablet *Therapy completed       Review of Systems   Constitutional: Negative for chills and fatigue.   Respiratory: Negative for cough and shortness of breath.    Gastrointestinal: Negative for diarrhea, nausea and vomiting.   Skin: Negative for rash.   Psychiatric/Behavioral: The patient is nervous/anxious.              /80   Pulse 62   Temp 98.3 °F (36.8 °C) (Oral)   Resp 16   Ht 160 cm (63\")   Wt 59 kg (130 lb)   SpO2 95%   BMI 23.03 kg/m²       Physical Exam   Constitutional: She is oriented to person, place, and time. She appears well-developed and well-nourished. No distress.   HENT:   Head: Normocephalic and atraumatic.   Right Ear: External ear normal.   Left Ear: External ear normal.   Mouth/Throat: Oropharynx is clear and moist. No oropharyngeal exudate.   Eyes: Conjunctivae are normal. Right eye exhibits no discharge. Left eye exhibits no discharge. No scleral icterus.   Neck: Neck supple.   Cardiovascular: Normal rate, regular rhythm and normal heart sounds. Exam reveals no gallop and no friction rub.   No murmur heard.  Pulmonary/Chest: Effort normal and breath sounds normal. No respiratory distress. She has no wheezes. She has no rales.   Abdominal: Soft. Bowel sounds are normal. She exhibits no distension and no mass. There is no tenderness. There is no guarding.   Lymphadenopathy:     She has no cervical adenopathy.   Neurological: She is alert and oriented to person, place, and time.   Skin: Skin is warm. No rash noted.   Psychiatric: Her behavior is normal. Her mood appears anxious. Her affect is labile.   Nursing note and vitals reviewed.        Results for orders placed or performed in visit on 03/20/19   Basic metabolic panel   Result Value Ref Range    Glucose 117 (H) 65 - 99 mg/dL "    BUN 18 8 - 27 mg/dL    Creatinine 1.13 (H) 0.57 - 1.00 mg/dL    eGFR Non African Am 48 (L) >59 mL/min/1.73    eGFR African Am 55 (L) >59 mL/min/1.73    BUN/Creatinine Ratio 16 12 - 28    Sodium 144 134 - 144 mmol/L    Potassium 4.8 3.5 - 5.2 mmol/L    Chloride 106 96 - 106 mmol/L    Total CO2 23 20 - 29 mmol/L    Calcium 9.5 8.7 - 10.3 mg/dL           Sanjuanita was seen today for anxiety.    Diagnoses and all orders for this visit:    Severe anxiety    Essential hypertension    Other orders  -     ALPRAZolam (XANAX) 0.25 MG tablet; Take 1 tablet by mouth 3 (Three) Times a Day As Needed for Anxiety.      Will start Xanax TID PRN which she has been on before. She cannot wean this. She has not done well on multiple SSRI's, SNRI's, and Buspar. Spent greater than 50% of 25 minutes in counseling regarding her anxiety, risks of medication including falling and dizziness. She understands risk and wants to continue.       Return in about 3 months (around 10/26/2019) for Recheck anxiety .    Hedy Gonzalez MD  07/26/2019

## 2019-08-22 RX ORDER — ALPRAZOLAM 0.5 MG/1
0.5 TABLET ORAL 2 TIMES DAILY PRN
Qty: 60 TABLET | Refills: 0 | Status: SHIPPED | OUTPATIENT
Start: 2019-08-22 | End: 2019-09-16 | Stop reason: SDUPTHER

## 2019-08-30 RX ORDER — BISOPROLOL FUMARATE 5 MG/1
5 TABLET, FILM COATED ORAL DAILY
Qty: 90 TABLET | Refills: 0 | Status: SHIPPED | OUTPATIENT
Start: 2019-08-30 | End: 2019-11-22 | Stop reason: SDUPTHER

## 2019-09-17 RX ORDER — ALPRAZOLAM 0.5 MG/1
0.5 TABLET ORAL 2 TIMES DAILY PRN
Qty: 60 TABLET | Refills: 0 | Status: SHIPPED | OUTPATIENT
Start: 2019-09-17 | End: 2019-10-15 | Stop reason: SDUPTHER

## 2019-09-19 ENCOUNTER — TELEPHONE (OUTPATIENT)
Dept: INTERNAL MEDICINE | Facility: CLINIC | Age: 74
End: 2019-09-19

## 2019-09-19 NOTE — TELEPHONE ENCOUNTER
"Regarding: RE: Prescription Question  Contact: [phone]  ----- Message from Albinot, Generic sent at 9/19/2019  8:35 AM EDT -----    Hedy, I understand about the Xanax increase.  Wish there was another med that would stop my anxiety attacks.  But I know we've tried so many!  Arghhhh  If you have a \"miracle\" drug other than Xanax I would love it!!!  Will continue with 2 a day at your advice.  Should I wait 'til November to get flu and shingles shots at my appt then or go to [facility] to get before then?  Mornings are just the worse with attacks not sure why, but I will keep on, keep on!  Thanks!  ----- Message -----  From: Hedy Gonzalez MD  Sent: 9/18/2019  7:08 PM EDT  To: Sanjuanita CALLIE Bowen  Subject: RE: Prescription Question  Mrs. Bowen,     I'm sorry I replied to your message yesterday, but I am not sure that it got relayed to you. I really don't like you taking 3 Xanax a day at 74 years old. It's just so much medication and not advised. Please see if you can try to just do two per day and we can increase the dose if that will help as well although I really don't want to do that.       In terms of shots, you only need Flu and the new shingles vaccine called Shingrix. You are up to date on all the other major ones.       ----- Message -----     From: Sanjuanita Bowen     Sent: 9/18/2019  8:07 AM EDT       To: Hedy Gonzalez MD  Subject: Prescription Question    Hedy, wondering if we can increase Xanax to 3 times a day!  Really struggling - psychologist is trying hypnosis on me - biofeed back isn't covered by insurance so unable to try that.   If I take 2 to 3 or 2 and a half of Xanax, I'm able to at least function.  Please advise - also wondering what shots I'm due for before my appt w/you in Nov??  flu, etc.???  Thank you    "

## 2019-10-15 RX ORDER — ALPRAZOLAM 0.5 MG/1
0.5 TABLET ORAL 2 TIMES DAILY PRN
Qty: 60 TABLET | Refills: 0 | Status: SHIPPED | OUTPATIENT
Start: 2019-10-15 | End: 2019-11-15 | Stop reason: SDUPTHER

## 2019-11-01 ENCOUNTER — OFFICE VISIT (OUTPATIENT)
Dept: INTERNAL MEDICINE | Facility: CLINIC | Age: 74
End: 2019-11-01

## 2019-11-01 VITALS
HEIGHT: 63 IN | HEART RATE: 69 BPM | OXYGEN SATURATION: 97 % | BODY MASS INDEX: 23.39 KG/M2 | WEIGHT: 132 LBS | DIASTOLIC BLOOD PRESSURE: 74 MMHG | SYSTOLIC BLOOD PRESSURE: 128 MMHG | RESPIRATION RATE: 14 BRPM | TEMPERATURE: 98.5 F

## 2019-11-01 DIAGNOSIS — F17.219 CIGARETTE NICOTINE DEPENDENCE WITH NICOTINE-INDUCED DISORDER: ICD-10-CM

## 2019-11-01 DIAGNOSIS — R73.01 IMPAIRED FASTING GLUCOSE: ICD-10-CM

## 2019-11-01 DIAGNOSIS — F41.9 SEVERE ANXIETY: ICD-10-CM

## 2019-11-01 DIAGNOSIS — E78.01 FAMILIAL HYPERCHOLESTEROLEMIA: ICD-10-CM

## 2019-11-01 DIAGNOSIS — N18.30 CKD (CHRONIC KIDNEY DISEASE) STAGE 3, GFR 30-59 ML/MIN (HCC): Primary | ICD-10-CM

## 2019-11-01 DIAGNOSIS — I10 ESSENTIAL HYPERTENSION: ICD-10-CM

## 2019-11-01 DIAGNOSIS — Z79.899 HIGH RISK MEDICATION USE: ICD-10-CM

## 2019-11-01 DIAGNOSIS — R42 DIZZINESS: ICD-10-CM

## 2019-11-01 PROCEDURE — 99214 OFFICE O/P EST MOD 30 MIN: CPT | Performed by: INTERNAL MEDICINE

## 2019-11-01 NOTE — PROGRESS NOTES
Sanjuanita Bowen is a 74 y.o. female, who presents with a chief complaint of   Chief Complaint   Patient presents with   • Follow-up   • Anxiety       73 yo F with severe anxiety, HTN and HLD. She is still smoking. Her life has been very stressful since lost her  close to 2 years ago now.     She does feel dizzy occasionally and has balance issues in the morning before taking her Xanax. She does sweat sometimes before then as well. She has had this for months. She takes 0.5mg of Xanax BID that helps. She states that when she takes TID, she actually feels better. She has tried mutiple SSRI's, SNRI's, Wellbutrin and therapy that has not helped. She has not tolerated any other medication besides Xanax.     She has seen multiple mental health in the past that have not helped her including Dr. Kumar at [facility]. Has wanted to try Biofeedback, but not sure that this will help and it is costly.     She is taking Zebeta for her HTN that is helping. No CP or SOB. Doesn't check regularly at home.     She is taking Zetia fro her HLD and feels well on this. She has not tolerated statins in the past.          The following portions of the patient's history were reviewed and updated as appropriate: allergies, current medications, past family history, past medical history, past social history, past surgical history and problem list.    Allergies: Patient has no known allergies.    Current Outpatient Medications:   •  ALPRAZolam (XANAX) 0.5 MG tablet, TAKE 1 TABLET BY MOUTH 2 (TWO) TIMES A DAY AS NEEDED FOR ANXIETY., Disp: 60 tablet, Rfl: 0  •  bisoprolol (ZEBeta) 5 MG tablet, Take 1 tablet by mouth Daily., Disp: 90 tablet, Rfl: 0  •  Cholecalciferol (VITAMIN D PO), Take 1 tablet by mouth daily., Disp: , Rfl:   •  Cyanocobalamin (VITAMIN B12 PO), Take  by mouth., Disp: , Rfl:   •  ezetimibe (ZETIA) 10 MG tablet, TAKE 1 TABLET BY MOUTH DAILY., Disp: 90 tablet, Rfl: 3  •  vitamin E 400 UNIT capsule, Take 400 Units by mouth  "Daily., Disp: , Rfl:   There are no discontinued medications.    Review of Systems   Constitutional: Negative for chills, fatigue and fever.   Respiratory: Negative for cough and shortness of breath.    Cardiovascular: Negative for chest pain and palpitations.   Gastrointestinal: Negative for abdominal pain.   Endocrine: Positive for heat intolerance.   Neurological: Positive for dizziness.   Psychiatric/Behavioral: Negative for sleep disturbance. The patient is nervous/anxious.              /74 (BP Location: Left arm, Patient Position: Sitting, Cuff Size: Adult)   Pulse 69   Temp 98.5 °F (36.9 °C) (Oral)   Resp 14   Ht 160 cm (63\")   Wt 59.9 kg (132 lb)   SpO2 97%   BMI 23.38 kg/m²       Physical Exam   Constitutional: She is oriented to person, place, and time. She appears well-developed and well-nourished. No distress.   HENT:   Head: Normocephalic and atraumatic.   Right Ear: Hearing, tympanic membrane, external ear and ear canal normal.   Left Ear: Hearing, tympanic membrane, external ear and ear canal normal.   Nose: Nose normal.   Mouth/Throat: Uvula is midline and oropharynx is clear and moist. No oropharyngeal exudate.   Eyes: Conjunctivae are normal. Right eye exhibits no discharge. Left eye exhibits no discharge. No scleral icterus.   Neck: Neck supple. Carotid bruit is not present. No thyroid mass present.   Cardiovascular: Normal rate, regular rhythm and normal heart sounds. Exam reveals no gallop and no friction rub.   No murmur heard.  Pulmonary/Chest: Effort normal and breath sounds normal. No respiratory distress. She has no wheezes. She has no rales.   Lymphadenopathy:     She has no cervical adenopathy.   Neurological: She is alert and oriented to person, place, and time.   Skin: Skin is warm. No rash noted.   Psychiatric: She has a normal mood and affect. Her behavior is normal.   Nursing note and vitals reviewed.        Results for orders placed or performed in visit on 03/20/19 "   Basic metabolic panel   Result Value Ref Range    Glucose 117 (H) 65 - 99 mg/dL    BUN 18 8 - 27 mg/dL    Creatinine 1.13 (H) 0.57 - 1.00 mg/dL    eGFR Non African Am 48 (L) >59 mL/min/1.73    eGFR African Am 55 (L) >59 mL/min/1.73    BUN/Creatinine Ratio 16 12 - 28    Sodium 144 134 - 144 mmol/L    Potassium 4.8 3.5 - 5.2 mmol/L    Chloride 106 96 - 106 mmol/L    Total CO2 23 20 - 29 mmol/L    Calcium 9.5 8.7 - 10.3 mg/dL           Sanjuanita was seen today for follow-up and anxiety.    Diagnoses and all orders for this visit:    CKD (chronic kidney disease) stage 3, GFR 30-59 ml/min (CMS/[facility])  -     Comprehensive Metabolic Panel    Familial hypercholesterolemia  -     Lipid Panel With LDL / HDL Ratio    Essential hypertension  -     CBC & Differential  -     Comprehensive Metabolic Panel  -     Urinalysis With Culture If Indicated - Urine, Clean Catch    Impaired fasting glucose  -     Hemoglobin A1c    Severe anxiety    Dizziness  -     US Carotid Bilateral    High risk medication use    Cigarette nicotine dependence with nicotine-induced disorder      Mrs. Bowen is robles as usuall, but still very anxious even on her Xanax (has been on this for years). She has not done well on other medications and we have tried to wean with no success. For now, we are going to continue at this dose. She understands risks and benefits and addiction potential. Wants to continue this even though it seems that she may be actually withdrawing from this in the AM causing her sweating and dizziness. Once she takes her Xanax, her symptoms resolve.     Will check u/s of her carotids given her dizziness.     Will get labs in the next several weeks and we will call he with results.     Stay on her Zebeta and BB for her HDL and HTN. She is not interested in quitting smoking.     Mammogram is due in 6/2020. C-scope due in 2020 with Dr. Mistry     Return in about 3 months (around 2/1/2020) for Recheck.    Hedy Gonzalez MD  11/01/2019

## 2019-11-11 LAB
ALBUMIN SERPL-MCNC: 4.8 G/DL (ref 3.5–4.8)
ALBUMIN/GLOB SERPL: 1.8 {RATIO} (ref 1.2–2.2)
ALP SERPL-CCNC: 86 IU/L (ref 39–117)
ALT SERPL-CCNC: 17 IU/L (ref 0–32)
APPEARANCE UR: CLEAR
AST SERPL-CCNC: 22 IU/L (ref 0–40)
BACTERIA #/AREA URNS HPF: ABNORMAL /[HPF]
BACTERIA UR CULT: NORMAL
BACTERIA UR CULT: NORMAL
BASOPHILS # BLD AUTO: 0 X10E3/UL (ref 0–0.2)
BASOPHILS NFR BLD AUTO: 1 %
BILIRUB SERPL-MCNC: 0.8 MG/DL (ref 0–1.2)
BILIRUB UR QL STRIP: NEGATIVE
BUN SERPL-MCNC: 21 MG/DL (ref 8–27)
BUN/CREAT SERPL: 19 (ref 12–28)
CALCIUM SERPL-MCNC: 9.9 MG/DL (ref 8.7–10.3)
CHLORIDE SERPL-SCNC: 103 MMOL/L (ref 96–106)
CHOLEST SERPL-MCNC: 216 MG/DL (ref 100–199)
CO2 SERPL-SCNC: 23 MMOL/L (ref 20–29)
COLOR UR: YELLOW
CREAT SERPL-MCNC: 1.11 MG/DL (ref 0.57–1)
CRYSTALS URNS MICRO: ABNORMAL
EOSINOPHIL # BLD AUTO: 0.1 X10E3/UL (ref 0–0.4)
EOSINOPHIL NFR BLD AUTO: 2 %
EPI CELLS #/AREA URNS HPF: ABNORMAL /HPF
ERYTHROCYTE [DISTWIDTH] IN BLOOD BY AUTOMATED COUNT: 13.7 % (ref 12.3–15.4)
GLOBULIN SER CALC-MCNC: 2.7 G/DL (ref 1.5–4.5)
GLUCOSE SERPL-MCNC: 121 MG/DL (ref 65–99)
GLUCOSE UR QL: NEGATIVE
HBA1C MFR BLD: 6.2 % (ref 4.8–5.6)
HCT VFR BLD AUTO: 49.4 % (ref 34–46.6)
HDLC SERPL-MCNC: 73 MG/DL
HGB BLD-MCNC: 16.6 G/DL (ref 11.1–15.9)
HGB UR QL STRIP: ABNORMAL
IMM GRANULOCYTES # BLD AUTO: 0 X10E3/UL (ref 0–0.1)
IMM GRANULOCYTES NFR BLD AUTO: 0 %
KETONES UR QL STRIP: NEGATIVE
LDLC SERPL CALC-MCNC: 125 MG/DL (ref 0–99)
LDLC/HDLC SERPL: 1.7 RATIO (ref 0–3.2)
LEUKOCYTE ESTERASE UR QL STRIP: ABNORMAL
LYMPHOCYTES # BLD AUTO: 1.5 X10E3/UL (ref 0.7–3.1)
LYMPHOCYTES NFR BLD AUTO: 17 %
MCH RBC QN AUTO: 33.1 PG (ref 26.6–33)
MCHC RBC AUTO-ENTMCNC: 33.6 G/DL (ref 31.5–35.7)
MCV RBC AUTO: 99 FL (ref 79–97)
MICRO URNS: ABNORMAL
MONOCYTES # BLD AUTO: 0.7 X10E3/UL (ref 0.1–0.9)
MONOCYTES NFR BLD AUTO: 8 %
MUCOUS THREADS URNS QL MICRO: PRESENT
NEUTROPHILS # BLD AUTO: 6.5 X10E3/UL (ref 1.4–7)
NEUTROPHILS NFR BLD AUTO: 72 %
NITRITE UR QL STRIP: NEGATIVE
PH UR STRIP: 5 [PH] (ref 5–7.5)
PLATELET # BLD AUTO: 202 X10E3/UL (ref 150–450)
POTASSIUM SERPL-SCNC: 4.7 MMOL/L (ref 3.5–5.2)
PROT SERPL-MCNC: 7.5 G/DL (ref 6–8.5)
PROT UR QL STRIP: NEGATIVE
RBC # BLD AUTO: 5.01 X10E6/UL (ref 3.77–5.28)
RBC #/AREA URNS HPF: ABNORMAL /HPF
SODIUM SERPL-SCNC: 142 MMOL/L (ref 134–144)
SP GR UR: 1.02 (ref 1–1.03)
TRIGL SERPL-MCNC: 92 MG/DL (ref 0–149)
UNIDENT CRYS URNS QL MICRO: PRESENT
URINALYSIS REFLEX: ABNORMAL
UROBILINOGEN UR STRIP-MCNC: 0.2 MG/DL (ref 0.2–1)
VLDLC SERPL CALC-MCNC: 18 MG/DL (ref 5–40)
WBC # BLD AUTO: 8.8 X10E3/UL (ref 3.4–10.8)
WBC #/AREA URNS HPF: ABNORMAL /HPF

## 2019-11-13 RX ORDER — ALPRAZOLAM 0.5 MG/1
0.5 TABLET ORAL 2 TIMES DAILY PRN
Qty: 60 TABLET | Refills: 0 | Status: CANCELLED | OUTPATIENT
Start: 2019-11-13

## 2019-11-13 NOTE — PROGRESS NOTES
Labs look stable. She does have some blood in her urine. We need to recheck this to make sure that it resolves. Urine culture is all negative, so no infection. Please have her recheck in one week. If still there, will have her see urology.

## 2019-11-14 ENCOUNTER — TELEPHONE (OUTPATIENT)
Dept: INTERNAL MEDICINE | Facility: CLINIC | Age: 74
End: 2019-11-14

## 2019-11-14 NOTE — TELEPHONE ENCOUNTER
Patient calling about having repeat U/A due to recent result.  She would like to go to [facility] near her home on Monday, November 18th if orders can be put in.  Patient request call with final instructions.

## 2019-11-15 DIAGNOSIS — R31.9 HEMATURIA, UNSPECIFIED TYPE: ICD-10-CM

## 2019-11-15 DIAGNOSIS — R31.9 HEMATURIA, UNSPECIFIED TYPE: Primary | ICD-10-CM

## 2019-11-15 RX ORDER — ALPRAZOLAM 0.5 MG/1
0.5 TABLET ORAL 2 TIMES DAILY PRN
Qty: 60 TABLET | Refills: 0 | Status: SHIPPED | OUTPATIENT
Start: 2019-11-15

## 2019-11-22 RX ORDER — BISOPROLOL FUMARATE 5 MG/1
TABLET, FILM COATED ORAL
Qty: 90 TABLET | Refills: 0 | Status: SHIPPED | OUTPATIENT
Start: 2019-11-22

## 2019-11-24 LAB
APPEARANCE UR: CLEAR
BACTERIA #/AREA URNS HPF: ABNORMAL /[HPF]
BACTERIA UR CULT: ABNORMAL
BILIRUB UR QL STRIP: NEGATIVE
CASTS URNS MICRO: ABNORMAL
CASTS URNS QL MICRO: PRESENT /LPF
COLOR UR: YELLOW
EPI CELLS #/AREA URNS HPF: ABNORMAL /HPF
GLUCOSE UR QL: NEGATIVE
HGB UR QL STRIP: ABNORMAL
KETONES UR QL STRIP: NEGATIVE
LEUKOCYTE ESTERASE UR QL STRIP: ABNORMAL
MICRO URNS: ABNORMAL
MUCOUS THREADS URNS QL MICRO: PRESENT
NITRITE UR QL STRIP: NEGATIVE
OTHER ANTIBIOTIC SUSC ISLT: ABNORMAL
PH UR STRIP: 5.5 [PH] (ref 5–7.5)
PROT UR QL STRIP: NEGATIVE
RBC #/AREA URNS HPF: ABNORMAL /HPF
SP GR UR: 1.02 (ref 1–1.03)
URINALYSIS REFLEX: ABNORMAL
UROBILINOGEN UR STRIP-MCNC: 0.2 MG/DL (ref 0.2–1)
WBC #/AREA URNS HPF: ABNORMAL /HPF

## 2020-11-02 ENCOUNTER — APPOINTMENT (OUTPATIENT)
Dept: WOMENS IMAGING | Facility: HOSPITAL | Age: 75
End: 2020-11-02

## 2020-11-02 PROCEDURE — 77067 SCR MAMMO BI INCL CAD: CPT | Performed by: RADIOLOGY

## 2020-11-02 PROCEDURE — 77063 BREAST TOMOSYNTHESIS BI: CPT | Performed by: RADIOLOGY

## 2021-03-02 DIAGNOSIS — Z23 IMMUNIZATION DUE: ICD-10-CM

## 2021-11-04 ENCOUNTER — APPOINTMENT (OUTPATIENT)
Dept: WOMENS IMAGING | Facility: HOSPITAL | Age: 76
End: 2021-11-04

## 2021-11-04 PROCEDURE — 77067 SCR MAMMO BI INCL CAD: CPT | Performed by: RADIOLOGY

## 2021-11-04 PROCEDURE — 77063 BREAST TOMOSYNTHESIS BI: CPT | Performed by: RADIOLOGY

## 2025-05-30 NOTE — TELEPHONE ENCOUNTER
Patient advised via [facility]. BMP order placed.     ----- Message from Hedy Gonzalez MD sent at 3/18/2019  8:35 AM EDT -----  Labs all look good except kidney function is down a little bit. Needs to push fluids and drink a lot of water. Repeat kidney function in 3-4 weeks, just needs a non-fasting BMP.    
Abdomen soft, non-tender, no guarding.